# Patient Record
Sex: FEMALE | Race: WHITE | Employment: OTHER | URBAN - METROPOLITAN AREA
[De-identification: names, ages, dates, MRNs, and addresses within clinical notes are randomized per-mention and may not be internally consistent; named-entity substitution may affect disease eponyms.]

---

## 2017-12-22 LAB — DIABETIC RETINOPATHY: NEGATIVE

## 2018-07-14 ENCOUNTER — HOSPITAL ENCOUNTER (EMERGENCY)
Age: 73
Discharge: HOME | End: 2018-07-14
Attending: EMERGENCY MEDICINE
Payer: MEDICARE

## 2018-07-14 ENCOUNTER — APPOINTMENT (OUTPATIENT)
Dept: GENERAL RADIOLOGY | Age: 73
End: 2018-07-14
Payer: MEDICARE

## 2018-07-14 VITALS
RESPIRATION RATE: 16 BRPM | HEART RATE: 84 BPM | OXYGEN SATURATION: 93 % | SYSTOLIC BLOOD PRESSURE: 109 MMHG | HEIGHT: 65 IN | BODY MASS INDEX: 27.32 KG/M2 | DIASTOLIC BLOOD PRESSURE: 58 MMHG | WEIGHT: 164 LBS | TEMPERATURE: 97.8 F

## 2018-07-14 DIAGNOSIS — J06.9 UPPER RESPIRATORY TRACT INFECTION, UNSPECIFIED TYPE: Primary | ICD-10-CM

## 2018-07-14 DIAGNOSIS — R06.00 DYSPNEA AND RESPIRATORY ABNORMALITIES: ICD-10-CM

## 2018-07-14 DIAGNOSIS — R06.89 DYSPNEA AND RESPIRATORY ABNORMALITIES: ICD-10-CM

## 2018-07-14 LAB
ANION GAP SERPL CALCULATED.3IONS-SCNC: 15 MMOL/L (ref 7–16)
BASOPHILS ABSOLUTE: 0.04 E9/L (ref 0–0.2)
BASOPHILS RELATIVE PERCENT: 0.7 % (ref 0–2)
BUN BLDV-MCNC: 28 MG/DL (ref 8–23)
CALCIUM SERPL-MCNC: 9.9 MG/DL (ref 8.6–10.2)
CHLORIDE BLD-SCNC: 96 MMOL/L (ref 98–107)
CO2: 25 MMOL/L (ref 22–29)
CREAT SERPL-MCNC: 1.1 MG/DL (ref 0.5–1)
EKG ATRIAL RATE: 83 BPM
EKG P AXIS: 37 DEGREES
EKG P-R INTERVAL: 162 MS
EKG Q-T INTERVAL: 380 MS
EKG QRS DURATION: 70 MS
EKG QTC CALCULATION (BAZETT): 446 MS
EKG R AXIS: 37 DEGREES
EKG T AXIS: 57 DEGREES
EKG VENTRICULAR RATE: 83 BPM
EOSINOPHILS ABSOLUTE: 0.1 E9/L (ref 0.05–0.5)
EOSINOPHILS RELATIVE PERCENT: 1.7 % (ref 0–6)
GFR AFRICAN AMERICAN: 59
GFR NON-AFRICAN AMERICAN: 49 ML/MIN/1.73
GLUCOSE BLD-MCNC: 255 MG/DL (ref 74–109)
HCT VFR BLD CALC: 36.9 % (ref 34–48)
HEMOGLOBIN: 12.2 G/DL (ref 11.5–15.5)
IMMATURE GRANULOCYTES #: 0.02 E9/L
IMMATURE GRANULOCYTES %: 0.3 % (ref 0–5)
LYMPHOCYTES ABSOLUTE: 1.69 E9/L (ref 1.5–4)
LYMPHOCYTES RELATIVE PERCENT: 28.7 % (ref 20–42)
MCH RBC QN AUTO: 30 PG (ref 26–35)
MCHC RBC AUTO-ENTMCNC: 33.1 % (ref 32–34.5)
MCV RBC AUTO: 90.7 FL (ref 80–99.9)
MONOCYTES ABSOLUTE: 0.47 E9/L (ref 0.1–0.95)
MONOCYTES RELATIVE PERCENT: 8 % (ref 2–12)
NEUTROPHILS ABSOLUTE: 3.56 E9/L (ref 1.8–7.3)
NEUTROPHILS RELATIVE PERCENT: 60.6 % (ref 43–80)
PDW BLD-RTO: 13.1 FL (ref 11.5–15)
PLATELET # BLD: 255 E9/L (ref 130–450)
PMV BLD AUTO: 10.5 FL (ref 7–12)
POTASSIUM SERPL-SCNC: 4.7 MMOL/L (ref 3.5–5)
PRO-BNP: 17 PG/ML (ref 0–125)
RBC # BLD: 4.07 E12/L (ref 3.5–5.5)
SODIUM BLD-SCNC: 136 MMOL/L (ref 132–146)
TROPONIN: <0.01 NG/ML (ref 0–0.03)
WBC # BLD: 5.9 E9/L (ref 4.5–11.5)

## 2018-07-14 PROCEDURE — 71045 X-RAY EXAM CHEST 1 VIEW: CPT

## 2018-07-14 PROCEDURE — 99285 EMERGENCY DEPT VISIT HI MDM: CPT

## 2018-07-14 PROCEDURE — 85025 COMPLETE CBC W/AUTO DIFF WBC: CPT

## 2018-07-14 PROCEDURE — 80048 BASIC METABOLIC PNL TOTAL CA: CPT

## 2018-07-14 PROCEDURE — 83880 ASSAY OF NATRIURETIC PEPTIDE: CPT

## 2018-07-14 PROCEDURE — 84484 ASSAY OF TROPONIN QUANT: CPT

## 2018-07-14 RX ORDER — FLUTICASONE PROPIONATE 50 MCG
1 SPRAY, SUSPENSION (ML) NASAL DAILY
Qty: 1 BOTTLE | Refills: 0 | Status: SHIPPED | OUTPATIENT
Start: 2018-07-14 | End: 2018-10-04

## 2018-07-14 RX ORDER — FLUTICASONE PROPIONATE 50 MCG
1 SPRAY, SUSPENSION (ML) NASAL DAILY
Qty: 1 BOTTLE | Refills: 0 | Status: SHIPPED | OUTPATIENT
Start: 2018-07-14 | End: 2018-07-14

## 2018-07-14 RX ORDER — FLUTICASONE PROPIONATE 50 MCG
1 SPRAY, SUSPENSION (ML) NASAL DAILY
Qty: 1 BOTTLE | Refills: 0 | Status: SHIPPED | OUTPATIENT
Start: 2018-07-14 | End: 2019-01-01

## 2018-07-14 ASSESSMENT — ENCOUNTER SYMPTOMS
CONSTIPATION: 0
RHINORRHEA: 0
COUGH: 0
SHORTNESS OF BREATH: 1
BACK PAIN: 0
SORE THROAT: 0
COLOR CHANGE: 0
SPUTUM PRODUCTION: 0
BLOOD IN STOOL: 0
NAUSEA: 0
HEMOPTYSIS: 0
DIARRHEA: 0
ABDOMINAL PAIN: 0
VOMITING: 0

## 2018-07-14 NOTE — ED PROVIDER NOTES
93-94%. She was not short of breath. [EM]      ED Course User Index  [EM] Michaela Temple   [TG] Ivannat Anna DO       --------------------------------------------- PAST HISTORY ---------------------------------------------  Past Medical History:  has a past medical history of Arthritis; Diabetes mellitus ([facility] [address] Fracture, femur ([facility] [address] Hyperlipidemia; Hypertension; Left lower quadrant pain; Memory changes; and Uses crutches. Past Surgical History:  has a past surgical history that includes Wrist surgery (Left); Femur Surgery (06/02/2017); fracture surgery; Colonoscopy (08/03/2017); Cataract removal with implant (Bilateral); and Appendectomy (08/29/2017). Social History:  reports that she has never smoked. She has never used smokeless tobacco. She reports that she does not drink alcohol or use drugs. Family History: family history includes Heart Disease in her mother. The patients home medications have been reviewed.     Allergies: Sulfa antibiotics    -------------------------------------------------- RESULTS -------------------------------------------------  Labs:  Results for orders placed or performed during the hospital encounter of 07/14/18   CBC Auto Differential   Result Value Ref Range    WBC 5.9 4.5 - 11.5 E9/L    RBC 4.07 3.50 - 5.50 E12/L    Hemoglobin 12.2 11.5 - 15.5 g/dL    Hematocrit 36.9 34.0 - 48.0 %    MCV 90.7 80.0 - 99.9 fL    MCH 30.0 26.0 - 35.0 pg    MCHC 33.1 32.0 - 34.5 %    RDW 13.1 11.5 - 15.0 fL    Platelets 560 709 - 274 E9/L    MPV 10.5 7.0 - 12.0 fL    Neutrophils % 60.6 43.0 - 80.0 %    Immature Granulocytes % 0.3 0.0 - 5.0 %    Lymphocytes % 28.7 20.0 - 42.0 %    Monocytes % 8.0 2.0 - 12.0 %    Eosinophils % 1.7 0.0 - 6.0 %    Basophils % 0.7 0.0 - 2.0 %    Neutrophils # 3.56 1.80 - 7.30 E9/L    Immature Granulocytes # 0.02 E9/L    Lymphocytes # 1.69 1.50 - 4.00 E9/L    Monocytes # 0.47 0.10 - 0.95 E9/L    Eosinophils # 0.10 0.05 - 0.50 E9/L    Basophils # 0.04 0.00

## 2018-08-04 ENCOUNTER — HOSPITAL ENCOUNTER (EMERGENCY)
Age: 73
Discharge: HOME | End: 2018-08-04
Payer: MEDICARE

## 2018-08-04 ENCOUNTER — APPOINTMENT (OUTPATIENT)
Dept: CT IMAGING | Age: 73
End: 2018-08-04
Payer: MEDICARE

## 2018-08-04 ENCOUNTER — APPOINTMENT (OUTPATIENT)
Dept: GENERAL RADIOLOGY | Age: 73
End: 2018-08-04
Payer: MEDICARE

## 2018-08-04 VITALS
DIASTOLIC BLOOD PRESSURE: 63 MMHG | HEART RATE: 91 BPM | RESPIRATION RATE: 16 BRPM | SYSTOLIC BLOOD PRESSURE: 137 MMHG | TEMPERATURE: 98.5 F | OXYGEN SATURATION: 98 %

## 2018-08-04 DIAGNOSIS — S09.90XA INJURY OF HEAD, INITIAL ENCOUNTER: Primary | ICD-10-CM

## 2018-08-04 DIAGNOSIS — S00.03XA HEMATOMA OF SCALP, INITIAL ENCOUNTER: ICD-10-CM
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
## 2018-08-04 PROCEDURE — 99284 EMERGENCY DEPT VISIT MOD MDM: CPT

## 2018-08-04 PROCEDURE — 6370000000 HC RX 637 (ALT 250 FOR IP): Performed by: NURSE PRACTITIONER

## 2018-08-04 PROCEDURE — 72040 X-RAY EXAM NECK SPINE 2-3 VW: CPT

## 2018-08-04 PROCEDURE — 70450 CT HEAD/BRAIN W/O DYE: CPT

## 2018-08-04 RX ORDER — ACETAMINOPHEN 325 MG/1
650 TABLET ORAL ONCE
Status: COMPLETED | OUTPATIENT
Start: 2018-08-04 | End: 2018-08-04

## 2018-08-04 RX ORDER — DIAPER,BRIEF,INFANT-TODD,DISP
EACH MISCELLANEOUS ONCE
Status: DISCONTINUED | OUTPATIENT
Start: 2018-08-04 | End: 2018-08-04 | Stop reason: HOSPADM

## 2018-08-04 RX ADMIN — ACETAMINOPHEN 650 MG: 325 TABLET ORAL at 16:57

## 2018-08-04 ASSESSMENT — PAIN DESCRIPTION - PAIN TYPE: TYPE: ACUTE PAIN

## 2018-08-04 ASSESSMENT — PAIN SCALES - GENERAL
PAINLEVEL_OUTOF10: 5
PAINLEVEL_OUTOF10: 5

## 2018-08-04 ASSESSMENT — PAIN DESCRIPTION - LOCATION: LOCATION: HEAD

## 2018-08-04 NOTE — ED PROVIDER NOTES
has never smoked. She has never used smokeless tobacco. She reports that she does not drink alcohol or use drugs. Family History: family history includes Heart Disease in her mother. Allergies: Sulfa antibiotics    Physical Exam          ED Triage Vitals [08/04/18 1621]   BP Temp Temp Source Pulse Resp SpO2 Height Weight   137/63 98.5 °F (36.9 °C) Oral 91 16 98 % -- --      Oxygen Saturation Interpretation: Normal.    Constitutional: Level of Consciousness: Awake and alert, cooperative. ETOH: No.               Distress: none. Head:  Traumatic:  yes: abrasion and cephalohematoma to right forehead. Scalp Tenderness:  frontal.                  Deformity: no.               Skin:  Cephalohematoma right forehead and Abrasion right forehead. Eyes:  PERRL, EOMI. No periorbital ecchymoses. Conjunctiva: normal.  Ears:  External ears without lesions. Throat:  Airway patient. Neck:  Supple. No midline tenderness, full ROM. Chest:  Symmetrical without visible rash or tenderness. Respiratory:  Clear to auscultation and breath sounds equal.  CV:  Regular rate and rhythm, normal heart sounds, without pathological murmurs. GI:  Abdomen Soft, nontender. No abrasions, ecchymoses, or lacerations. Back:  No costovertebral, paravertebral, intervertebral, or vertebral tenderness or spasm. Integument:  No abrasions, ecchymoses, or lacerations unless noted elsewhere. Extremities  No tenderness or swelling. Normal, painless range of motion. No neurovascular deficit. Neurological:  Oriented x 3,  GCS15. Motor functions intact. CN 2-12 grossly intact. Lab / Imaging Results   (All laboratory and radiology results have been personally reviewed by myself)  Labs:  No results found for this visit on 08/04/18. Imaging: All Radiology results interpreted by Radiologist unless otherwise noted.   XR CERVICAL SPINE (2-3 VIEWS)   Final Result      CT Head WO Contrast   Final Result        ED

## 2018-08-06 ENCOUNTER — HOSPITAL ENCOUNTER (EMERGENCY)
Age: 73
Discharge: HOME | End: 2018-08-06
Attending: EMERGENCY MEDICINE
Payer: MEDICARE

## 2018-08-06 VITALS
WEIGHT: 164 LBS | SYSTOLIC BLOOD PRESSURE: 124 MMHG | TEMPERATURE: 98.8 F | DIASTOLIC BLOOD PRESSURE: 59 MMHG | BODY MASS INDEX: 27.32 KG/M2 | HEIGHT: 65 IN | HEART RATE: 78 BPM | RESPIRATION RATE: 20 BRPM | OXYGEN SATURATION: 96 %

## 2018-08-06 DIAGNOSIS — S00.11XS: Primary | ICD-10-CM

## 2018-08-06 PROCEDURE — 99283 EMERGENCY DEPT VISIT LOW MDM: CPT

## 2018-08-06 ASSESSMENT — ENCOUNTER SYMPTOMS
EYE PAIN: 0
SHORTNESS OF BREATH: 0
COLOR CHANGE: 1
FACIAL SWELLING: 1
VOMITING: 0
NAUSEA: 0
COUGH: 0
WHEEZING: 0
VISUAL CHANGE: 0
ABDOMINAL PAIN: 0
PHOTOPHOBIA: 1
CHEST TIGHTNESS: 0
BOWEL INCONTINENCE: 0

## 2018-08-06 ASSESSMENT — PAIN SCALES - GENERAL: PAINLEVEL_OUTOF10: 5

## 2018-08-06 ASSESSMENT — PAIN DESCRIPTION - DESCRIPTORS: DESCRIPTORS: ACHING

## 2018-08-06 ASSESSMENT — PAIN DESCRIPTION - ORIENTATION: ORIENTATION: RIGHT

## 2018-08-06 ASSESSMENT — PAIN DESCRIPTION - FREQUENCY: FREQUENCY: CONTINUOUS

## 2018-08-06 ASSESSMENT — PAIN DESCRIPTION - LOCATION: LOCATION: FACE;HEAD

## 2018-08-06 NOTE — ED PROVIDER NOTES
patient's chart from 2 days ago was reviewed with specifically looking at the head CT that was performed. This result showed no signs of facial or skull fracture at that time. As a splint to the patient that the hematoma that was on these forehead above the eye is most likely responsible for the bruising and swelling of the eyelid as gravity pulls the blood down and tries to heal. It was offered to the patient that we could perform a repeat CT this time looking at the facial bones if the patient requested it but it was not necessary. The patient declined facial CT and is okay to be discharged home with follow-up with her primary care physician.            --------------------------------------------- PAST HISTORY ---------------------------------------------  Past Medical History:  has a past medical history of Arthritis; Diabetes mellitus ([facility] [address] Fracture, femur ([facility] [address] Hyperlipidemia; Hypertension; Left lower quadrant pain; Memory changes; and Uses crutches. Past Surgical History:  has a past surgical history that includes Wrist surgery (Left); Femur Surgery (06/02/2017); fracture surgery; Colonoscopy (08/03/2017); Cataract removal with implant (Bilateral); and Appendectomy (08/29/2017). Social History:  reports that she has never smoked. She has never used smokeless tobacco. She reports that she does not drink alcohol or use drugs. Family History: family history includes Heart Disease in her mother. The patients home medications have been reviewed. Allergies: Sulfa antibiotics    -------------------------------------------------- RESULTS -------------------------------------------------  Labs:  No results found for this visit on 08/06/18.     Radiology:  No orders to display       ------------------------- NURSING NOTES AND VITALS REVIEWED ---------------------------  Date / Time Roomed:  8/6/2018  1:08 PM  ED Bed Assignment:  20/20    The nursing notes within the ED encounter and vital signs as

## 2018-10-04 ENCOUNTER — APPOINTMENT (OUTPATIENT)
Dept: CT IMAGING | Age: 73
DRG: 481 | End: 2018-10-04
Payer: MEDICARE

## 2018-10-04 ENCOUNTER — APPOINTMENT (OUTPATIENT)
Dept: GENERAL RADIOLOGY | Age: 73
DRG: 481 | End: 2018-10-04
Payer: MEDICARE

## 2018-10-04 ENCOUNTER — ANESTHESIA EVENT (OUTPATIENT)
Dept: OPERATING ROOM | Age: 73
DRG: 481 | End: 2018-10-04
Payer: MEDICARE

## 2018-10-04 ENCOUNTER — HOSPITAL ENCOUNTER (INPATIENT)
Age: 73
LOS: 5 days | Discharge: NURSING, REHAB OR LONG-TERM CARE | DRG: 481 | End: 2018-10-09
Attending: EMERGENCY MEDICINE | Admitting: ORTHOPAEDIC SURGERY
Payer: MEDICARE

## 2018-10-04 DIAGNOSIS — S72.012A CLOSED SUBCAPITAL FRACTURE OF LEFT FEMUR, INITIAL ENCOUNTER (HCC): Primary | ICD-10-CM

## 2018-10-04 PROBLEM — S72.002A CLOSED LEFT HIP FRACTURE, INITIAL ENCOUNTER (HCC): Status: ACTIVE | Noted: 2018-10-04

## 2018-10-04 LAB
ANION GAP SERPL CALCULATED.3IONS-SCNC: 12 MMOL/L (ref 7–16)
APTT: 24.9 SEC (ref 24.5–35.1)
BACTERIA: ABNORMAL /HPF
BASOPHILS ABSOLUTE: 0.04 E9/L (ref 0–0.2)
BASOPHILS RELATIVE PERCENT: 0.3 % (ref 0–2)
BILIRUBIN URINE: NEGATIVE
BLOOD, URINE: NEGATIVE
BUN BLDV-MCNC: 21 MG/DL (ref 8–23)
CALCIUM SERPL-MCNC: 9.5 MG/DL (ref 8.6–10.2)
CHLORIDE BLD-SCNC: 102 MMOL/L (ref 98–107)
CLARITY: ABNORMAL
CO2: 25 MMOL/L (ref 22–29)
COLOR: ABNORMAL
CREAT SERPL-MCNC: 1 MG/DL (ref 0.5–1)
EOSINOPHILS ABSOLUTE: 0.13 E9/L (ref 0.05–0.5)
EOSINOPHILS RELATIVE PERCENT: 0.9 % (ref 0–6)
GFR AFRICAN AMERICAN: >60
GFR NON-AFRICAN AMERICAN: 54 ML/MIN/1.73
GLUCOSE BLD-MCNC: 159 MG/DL (ref 74–109)
GLUCOSE URINE: NEGATIVE MG/DL
HCT VFR BLD CALC: 37.5 % (ref 34–48)
HEMOGLOBIN: 12.3 G/DL (ref 11.5–15.5)
IMMATURE GRANULOCYTES #: 0.08 E9/L
IMMATURE GRANULOCYTES %: 0.5 % (ref 0–5)
INR BLD: 1
KETONES, URINE: NEGATIVE MG/DL
LEUKOCYTE ESTERASE, URINE: NEGATIVE
LYMPHOCYTES ABSOLUTE: 1.71 E9/L (ref 1.5–4)
LYMPHOCYTES RELATIVE PERCENT: 11.6 % (ref 20–42)
MCH RBC QN AUTO: 30.2 PG (ref 26–35)
MCHC RBC AUTO-ENTMCNC: 32.8 % (ref 32–34.5)
MCV RBC AUTO: 92.1 FL (ref 80–99.9)
METER GLUCOSE: 218 MG/DL (ref 70–110)
METER GLUCOSE: 223 MG/DL (ref 70–110)
MONOCYTES ABSOLUTE: 0.77 E9/L (ref 0.1–0.95)
MONOCYTES RELATIVE PERCENT: 5.2 % (ref 2–12)
NEUTROPHILS ABSOLUTE: 12.05 E9/L (ref 1.8–7.3)
NEUTROPHILS RELATIVE PERCENT: 81.5 % (ref 43–80)
NITRITE, URINE: POSITIVE
PDW BLD-RTO: 13.2 FL (ref 11.5–15)
PH UA: 5.5 (ref 5–9)
PLATELET # BLD: 254 E9/L (ref 130–450)
PMV BLD AUTO: 11.6 FL (ref 7–12)
POTASSIUM SERPL-SCNC: 4.6 MMOL/L (ref 3.5–5)
PROTEIN UA: NEGATIVE MG/DL
PROTHROMBIN TIME: 11.3 SEC (ref 9.3–12.4)
RBC # BLD: 4.07 E12/L (ref 3.5–5.5)
RBC UA: ABNORMAL /HPF (ref 0–2)
SODIUM BLD-SCNC: 139 MMOL/L (ref 132–146)
SPECIFIC GRAVITY UA: 1.01 (ref 1–1.03)
UROBILINOGEN, URINE: 0.2 E.U./DL
WBC # BLD: 14.8 E9/L (ref 4.5–11.5)
WBC UA: ABNORMAL /HPF (ref 0–5)

## 2018-10-04 PROCEDURE — 73700 CT LOWER EXTREMITY W/O DYE: CPT

## 2018-10-04 PROCEDURE — 87077 CULTURE AEROBIC IDENTIFY: CPT

## 2018-10-04 PROCEDURE — 80048 BASIC METABOLIC PNL TOTAL CA: CPT

## 2018-10-04 PROCEDURE — 6370000000 HC RX 637 (ALT 250 FOR IP): Performed by: EMERGENCY MEDICINE

## 2018-10-04 PROCEDURE — 71045 X-RAY EXAM CHEST 1 VIEW: CPT

## 2018-10-04 PROCEDURE — 99285 EMERGENCY DEPT VISIT HI MDM: CPT

## 2018-10-04 PROCEDURE — 85610 PROTHROMBIN TIME: CPT

## 2018-10-04 PROCEDURE — 36415 COLL VENOUS BLD VENIPUNCTURE: CPT

## 2018-10-04 PROCEDURE — 1200000000 HC SEMI PRIVATE

## 2018-10-04 PROCEDURE — 99222 1ST HOSP IP/OBS MODERATE 55: CPT | Performed by: INTERNAL MEDICINE

## 2018-10-04 PROCEDURE — 94761 N-INVAS EAR/PLS OXIMETRY MLT: CPT

## 2018-10-04 PROCEDURE — 85025 COMPLETE CBC W/AUTO DIFF WBC: CPT

## 2018-10-04 PROCEDURE — 87040 BLOOD CULTURE FOR BACTERIA: CPT

## 2018-10-04 PROCEDURE — 6370000000 HC RX 637 (ALT 250 FOR IP): Performed by: NURSE PRACTITIONER

## 2018-10-04 PROCEDURE — 85730 THROMBOPLASTIN TIME PARTIAL: CPT

## 2018-10-04 PROCEDURE — 73502 X-RAY EXAM HIP UNI 2-3 VIEWS: CPT

## 2018-10-04 PROCEDURE — APPSS45 APP SPLIT SHARED TIME 31-45 MINUTES: Performed by: NURSE PRACTITIONER

## 2018-10-04 PROCEDURE — 2580000003 HC RX 258: Performed by: NURSE PRACTITIONER

## 2018-10-04 PROCEDURE — 81001 URINALYSIS AUTO W/SCOPE: CPT

## 2018-10-04 PROCEDURE — 51702 INSERT TEMP BLADDER CATH: CPT

## 2018-10-04 PROCEDURE — 87186 SC STD MICRODIL/AGAR DIL: CPT

## 2018-10-04 PROCEDURE — 70450 CT HEAD/BRAIN W/O DYE: CPT

## 2018-10-04 PROCEDURE — 72125 CT NECK SPINE W/O DYE: CPT

## 2018-10-04 PROCEDURE — 87088 URINE BACTERIA CULTURE: CPT

## 2018-10-04 PROCEDURE — 82962 GLUCOSE BLOOD TEST: CPT

## 2018-10-04 RX ORDER — DEXTROSE MONOHYDRATE 50 MG/ML
100 INJECTION, SOLUTION INTRAVENOUS PRN
Status: DISCONTINUED | OUTPATIENT
Start: 2018-10-04 | End: 2018-10-09 | Stop reason: HOSPADM

## 2018-10-04 RX ORDER — HYDROCODONE BITARTRATE AND ACETAMINOPHEN 5; 325 MG/1; MG/1
1 TABLET ORAL EVERY 6 HOURS PRN
Status: DISCONTINUED | OUTPATIENT
Start: 2018-10-04 | End: 2018-10-05

## 2018-10-04 RX ORDER — DEXTROSE MONOHYDRATE 25 G/50ML
12.5 INJECTION, SOLUTION INTRAVENOUS PRN
Status: DISCONTINUED | OUTPATIENT
Start: 2018-10-04 | End: 2018-10-09 | Stop reason: HOSPADM

## 2018-10-04 RX ORDER — ATORVASTATIN CALCIUM 40 MG/1
20 TABLET, FILM COATED ORAL
Status: DISCONTINUED | OUTPATIENT
Start: 2018-10-04 | End: 2018-10-09 | Stop reason: HOSPADM

## 2018-10-04 RX ORDER — SODIUM CHLORIDE 0.9 % (FLUSH) 0.9 %
10 SYRINGE (ML) INJECTION PRN
Status: DISCONTINUED | OUTPATIENT
Start: 2018-10-04 | End: 2018-10-05

## 2018-10-04 RX ORDER — LISINOPRIL 10 MG/1
10 TABLET ORAL DAILY
Status: DISCONTINUED | OUTPATIENT
Start: 2018-10-04 | End: 2018-10-09 | Stop reason: HOSPADM

## 2018-10-04 RX ORDER — M-VIT,TX,IRON,MINS/CALC/FOLIC 27MG-0.4MG
1 TABLET ORAL
Status: DISCONTINUED | OUTPATIENT
Start: 2018-10-05 | End: 2018-10-09 | Stop reason: HOSPADM

## 2018-10-04 RX ORDER — NICOTINE POLACRILEX 4 MG
15 LOZENGE BUCCAL PRN
Status: DISCONTINUED | OUTPATIENT
Start: 2018-10-04 | End: 2018-10-09 | Stop reason: HOSPADM

## 2018-10-04 RX ORDER — SODIUM CHLORIDE 0.9 % (FLUSH) 0.9 %
10 SYRINGE (ML) INJECTION EVERY 12 HOURS SCHEDULED
Status: DISCONTINUED | OUTPATIENT
Start: 2018-10-04 | End: 2018-10-05

## 2018-10-04 RX ORDER — HYDROCODONE BITARTRATE AND ACETAMINOPHEN 5; 325 MG/1; MG/1
1 TABLET ORAL ONCE
Status: COMPLETED | OUTPATIENT
Start: 2018-10-04 | End: 2018-10-04

## 2018-10-04 RX ORDER — FLUTICASONE PROPIONATE 50 MCG
1 SPRAY, SUSPENSION (ML) NASAL DAILY PRN
Status: DISCONTINUED | OUTPATIENT
Start: 2018-10-04 | End: 2018-10-09 | Stop reason: HOSPADM

## 2018-10-04 RX ORDER — SODIUM CHLORIDE 9 MG/ML
INJECTION, SOLUTION INTRAVENOUS CONTINUOUS
Status: DISCONTINUED | OUTPATIENT
Start: 2018-10-04 | End: 2018-10-05

## 2018-10-04 RX ORDER — ACETAMINOPHEN 325 MG/1
650 TABLET ORAL EVERY 4 HOURS PRN
Status: DISCONTINUED | OUTPATIENT
Start: 2018-10-04 | End: 2018-10-09 | Stop reason: HOSPADM

## 2018-10-04 RX ORDER — ONDANSETRON 2 MG/ML
4 INJECTION INTRAMUSCULAR; INTRAVENOUS EVERY 6 HOURS PRN
Status: DISCONTINUED | OUTPATIENT
Start: 2018-10-04 | End: 2018-10-05

## 2018-10-04 RX ORDER — ATORVASTATIN CALCIUM 20 MG/1
20 TABLET, FILM COATED ORAL
COMMUNITY
End: 2019-01-01 | Stop reason: SDUPTHER

## 2018-10-04 RX ORDER — ONDANSETRON 2 MG/ML
4 INJECTION INTRAMUSCULAR; INTRAVENOUS EVERY 6 HOURS PRN
Status: DISCONTINUED | OUTPATIENT
Start: 2018-10-04 | End: 2018-10-04

## 2018-10-04 RX ORDER — GLIPIZIDE 10 MG/1
10 TABLET, FILM COATED, EXTENDED RELEASE ORAL 2 TIMES DAILY WITH MEALS
COMMUNITY
End: 2019-01-01 | Stop reason: SDUPTHER

## 2018-10-04 RX ORDER — HYDROCODONE BITARTRATE AND ACETAMINOPHEN 5; 325 MG/1; MG/1
0.5 TABLET ORAL ONCE
Status: COMPLETED | OUTPATIENT
Start: 2018-10-04 | End: 2018-10-04

## 2018-10-04 RX ADMIN — INSULIN LISPRO 1 UNITS: 100 INJECTION, SOLUTION INTRAVENOUS; SUBCUTANEOUS at 20:47

## 2018-10-04 RX ADMIN — INSULIN LISPRO 2 UNITS: 100 INJECTION, SOLUTION INTRAVENOUS; SUBCUTANEOUS at 18:07

## 2018-10-04 RX ADMIN — HYDROCODONE BITARTRATE AND ACETAMINOPHEN 0.5 TABLET: 5; 325 TABLET ORAL at 10:03

## 2018-10-04 RX ADMIN — HYDROCODONE BITARTRATE AND ACETAMINOPHEN 0.5 TABLET: 5; 325 TABLET ORAL at 16:42

## 2018-10-04 RX ADMIN — SODIUM CHLORIDE: 9 INJECTION, SOLUTION INTRAVENOUS at 18:03

## 2018-10-04 RX ADMIN — ATORVASTATIN CALCIUM 20 MG: 40 TABLET, FILM COATED ORAL at 18:07

## 2018-10-04 ASSESSMENT — PAIN DESCRIPTION - ORIENTATION: ORIENTATION: LEFT

## 2018-10-04 ASSESSMENT — PAIN SCALES - GENERAL
PAINLEVEL_OUTOF10: 10
PAINLEVEL_OUTOF10: 5
PAINLEVEL_OUTOF10: 0
PAINLEVEL_OUTOF10: 10
PAINLEVEL_OUTOF10: 0

## 2018-10-04 ASSESSMENT — PAIN DESCRIPTION - PAIN TYPE: TYPE: ACUTE PAIN

## 2018-10-04 ASSESSMENT — PAIN DESCRIPTION - LOCATION: LOCATION: HIP

## 2018-10-04 ASSESSMENT — PAIN DESCRIPTION - DESCRIPTORS: DESCRIPTORS: BURNING

## 2018-10-04 ASSESSMENT — LIFESTYLE VARIABLES: SMOKING_STATUS: 0

## 2018-10-04 ASSESSMENT — PAIN DESCRIPTION - FREQUENCY: FREQUENCY: CONTINUOUS

## 2018-10-04 NOTE — ED PROVIDER NOTES
femur fracture. Patient treated with norco for pain control. Labs sent showing mild nonspecific leukocytosis. UA appears contaminated without gross infection. Urine culture sent. Will defer treatment at this time. Ortho consulted, and I spoke with Dr. Rachel Alarcon. Patient will be admitted to medicine with ortho consult for further management. This patient's ED course included: a personal history and physicial examination and re-evaluation prior to disposition  This patient has remained hemodynamically stable during their ED course. Re-Evaluations:           Time 10:22 AM  Patient is stable. Discussed results. Upon re-examination, patient is resting. Left hip XR was negative for fracture. Patient was ambulated. According to , pt is ambulating okay but normal and needs more assistance than usual. Will ordered CT scan of head and hip. Time 1:57 PM  The emergency provider has shared the specific reasons for admission, and has shared results of today's workup; including CT scan results. The patient and spouse are agreeable for admission. Consultation  Time: 2:22 PM.   Spoke with Dr. Rachel Alarcon (Orthopedics). Discussed case. They will provide consultation. Time: 2:23 PM.   Spoke with Dr. Antonio Aguiar (Medicine) in the ED. Discussed case. They will admit the patient. Counseling: The emergency provider has spoken with the patient and discussed todays results, in addition to providing specific details for the plan of care and counseling regarding the diagnosis and prognosis. Questions are answered at this time and they are agreeable with the plan.       --------------------------------- IMPRESSION AND DISPOSITION ---------------------------------    IMPRESSION  1.  Closed subcapital fracture of left femur, initial encounter ([facility] [address]        DISPOSITION  Disposition: Admit to med/surg floor  Patient condition is stable    SCRIBE ATTESTATION  10/4/18, 9:24 AM.    This note is prepared by Yvon Stein

## 2018-10-04 NOTE — CONSULTS
while in the [place] where they own a vacation home. She had 3 cannulated screws to fix her fracture on that side as well. Today we discussed occasions for surgery. We also discussed risks which include risk of infection, blood loss, damage to nerves, arteries and veins, DVT/PE and death. We also discussed possibility of avascular necrosis of the femoral head and nonunion of the fracture. They agree to proceed to surgery. Surgery is planned for tomorrow, most likely early afternoon.
+bowel sounds  Extremities: No lower extremity edema  Neurologic: Normal mood and affect    Intake/Output:  No intake or output data in the 24 hours ending 10/04/18 1753  No intake/output data recorded. Laboratory Tests:  Recent Labs      10/04/18   0750   NA  139   K  4.6   CL  102   CO2  25   BUN  21   CREATININE  1.0   GLUCOSE  159*   CALCIUM  9.5     Recent Labs      10/04/18   0750   WBC  14.8*   RBC  4.07   HGB  12.3   HCT  37.5   MCV  92.1   MCH  30.2   MCHC  32.8   RDW  13.2   PLT  254   MPV  11.6     Lab Results   Component Value Date    TROPONINI <0.01 07/14/2018    TROPONINI <0.01 12/31/2017    TROPONINI <0.01 03/31/2017     Lab Results   Component Value Date    INR 1.0 10/04/2018    PROTIME 11.3 10/04/2018     Cardiac Tests:  ECG: SR, rate 91, no acute STT changes    Telemetry findings reviewed: SR/ST    Chest X-ray: 10/4/18  No airspace opacities or pleural effusion. ASSESSMENT / PLAN:  1. Preoperative cardiac evaluation (left hip fracture s/p mechanical fall)  2. HTN -- 's-150's, on ACE-I/HCTZ as an outpatient  3. HLD -- on statin  4. DM -- on metformin and glipizide  5. Sinus tachycardia (in the setting of the above)    - She may proceed with the anticipated surgery with no further advanced cardiac work-up at this time  - No change in medications from a cardiac standpoint  - Case discussed with the patient, her , and her son today    Thank you for allowing me to participate in your patient's care. Please feel free to contact me if you have any questions or concerns.     David Mcguire MD  [facility]) Cardiology

## 2018-10-04 NOTE — PLAN OF CARE
Problem: Aspiration - Risk of:  Goal: Absence of aspiration  Absence of aspiration    Outcome: Met This Shift

## 2018-10-04 NOTE — PLAN OF CARE
Problem: Infection:  Goal: Absence of urinary tract infection signs and symptoms  Absence of urinary tract infection signs and symptoms    Outcome: Ongoing

## 2018-10-04 NOTE — ED NOTES
Bed: 25  Expected date:   Expected time:   Means of arrival:   Comments:  ems     Andrea Fishman RN  10/04/18 2370

## 2018-10-05 ENCOUNTER — APPOINTMENT (OUTPATIENT)
Dept: GENERAL RADIOLOGY | Age: 73
DRG: 481 | End: 2018-10-05
Payer: MEDICARE

## 2018-10-05 ENCOUNTER — ANESTHESIA (OUTPATIENT)
Dept: OPERATING ROOM | Age: 73
DRG: 481 | End: 2018-10-05
Payer: MEDICARE

## 2018-10-05 VITALS — SYSTOLIC BLOOD PRESSURE: 126 MMHG | TEMPERATURE: 96.6 F | OXYGEN SATURATION: 100 % | DIASTOLIC BLOOD PRESSURE: 78 MMHG

## 2018-10-05 LAB
ANION GAP SERPL CALCULATED.3IONS-SCNC: 16 MMOL/L (ref 7–16)
BUN BLDV-MCNC: 20 MG/DL (ref 8–23)
CALCIUM SERPL-MCNC: 9.5 MG/DL (ref 8.6–10.2)
CHLORIDE BLD-SCNC: 99 MMOL/L (ref 98–107)
CO2: 22 MMOL/L (ref 22–29)
CREAT SERPL-MCNC: 0.8 MG/DL (ref 0.5–1)
GFR AFRICAN AMERICAN: >60
GFR NON-AFRICAN AMERICAN: >60 ML/MIN/1.73
GLUCOSE BLD-MCNC: 220 MG/DL (ref 74–109)
HCT VFR BLD CALC: 39 % (ref 34–48)
HEMOGLOBIN: 13.1 G/DL (ref 11.5–15.5)
MCH RBC QN AUTO: 30.5 PG (ref 26–35)
MCHC RBC AUTO-ENTMCNC: 33.6 % (ref 32–34.5)
MCV RBC AUTO: 90.7 FL (ref 80–99.9)
METER GLUCOSE: 196 MG/DL (ref 70–110)
METER GLUCOSE: 212 MG/DL (ref 70–110)
METER GLUCOSE: 262 MG/DL (ref 70–110)
METER GLUCOSE: 284 MG/DL (ref 70–110)
PDW BLD-RTO: 13.3 FL (ref 11.5–15)
PLATELET # BLD: 245 E9/L (ref 130–450)
PMV BLD AUTO: 11 FL (ref 7–12)
POTASSIUM REFLEX MAGNESIUM: 4.2 MMOL/L (ref 3.5–5)
RBC # BLD: 4.3 E12/L (ref 3.5–5.5)
SODIUM BLD-SCNC: 137 MMOL/L (ref 132–146)
WBC # BLD: 10.8 E9/L (ref 4.5–11.5)

## 2018-10-05 PROCEDURE — 2709999900 HC NON-CHARGEABLE SUPPLY: Performed by: ORTHOPAEDIC SURGERY

## 2018-10-05 PROCEDURE — 6360000002 HC RX W HCPCS: Performed by: ORTHOPAEDIC SURGERY

## 2018-10-05 PROCEDURE — 36415 COLL VENOUS BLD VENIPUNCTURE: CPT

## 2018-10-05 PROCEDURE — 2700000000 HC OXYGEN THERAPY PER DAY

## 2018-10-05 PROCEDURE — 6360000002 HC RX W HCPCS: Performed by: NURSE ANESTHETIST, CERTIFIED REGISTERED

## 2018-10-05 PROCEDURE — 87081 CULTURE SCREEN ONLY: CPT

## 2018-10-05 PROCEDURE — 3600000003 HC SURGERY LEVEL 3 BASE: Performed by: ORTHOPAEDIC SURGERY

## 2018-10-05 PROCEDURE — 1200000000 HC SEMI PRIVATE

## 2018-10-05 PROCEDURE — 0QS704Z REPOSITION LEFT UPPER FEMUR WITH INTERNAL FIXATION DEVICE, OPEN APPROACH: ICD-10-PCS | Performed by: ORTHOPAEDIC SURGERY

## 2018-10-05 PROCEDURE — 99232 SBSQ HOSP IP/OBS MODERATE 35: CPT | Performed by: INTERNAL MEDICINE

## 2018-10-05 PROCEDURE — 3209999900 FLUORO FOR SURGICAL PROCEDURES

## 2018-10-05 PROCEDURE — 80048 BASIC METABOLIC PNL TOTAL CA: CPT

## 2018-10-05 PROCEDURE — 3700000001 HC ADD 15 MINUTES (ANESTHESIA): Performed by: ORTHOPAEDIC SURGERY

## 2018-10-05 PROCEDURE — 3600000013 HC SURGERY LEVEL 3 ADDTL 15MIN: Performed by: ORTHOPAEDIC SURGERY

## 2018-10-05 PROCEDURE — 7100000000 HC PACU RECOVERY - FIRST 15 MIN: Performed by: ORTHOPAEDIC SURGERY

## 2018-10-05 PROCEDURE — 7100000001 HC PACU RECOVERY - ADDTL 15 MIN: Performed by: ORTHOPAEDIC SURGERY

## 2018-10-05 PROCEDURE — 2580000003 HC RX 258: Performed by: NURSE ANESTHETIST, CERTIFIED REGISTERED

## 2018-10-05 PROCEDURE — C1769 GUIDE WIRE: HCPCS | Performed by: ORTHOPAEDIC SURGERY

## 2018-10-05 PROCEDURE — 3700000000 HC ANESTHESIA ATTENDED CARE: Performed by: ORTHOPAEDIC SURGERY

## 2018-10-05 PROCEDURE — 6370000000 HC RX 637 (ALT 250 FOR IP): Performed by: NURSE PRACTITIONER

## 2018-10-05 PROCEDURE — 85027 COMPLETE CBC AUTOMATED: CPT

## 2018-10-05 PROCEDURE — 82962 GLUCOSE BLOOD TEST: CPT

## 2018-10-05 PROCEDURE — 2500000003 HC RX 250 WO HCPCS: Performed by: NURSE ANESTHETIST, CERTIFIED REGISTERED

## 2018-10-05 PROCEDURE — 6370000000 HC RX 637 (ALT 250 FOR IP): Performed by: ORTHOPAEDIC SURGERY

## 2018-10-05 PROCEDURE — C1713 ANCHOR/SCREW BN/BN,TIS/BN: HCPCS | Performed by: ORTHOPAEDIC SURGERY

## 2018-10-05 PROCEDURE — 2580000003 HC RX 258: Performed by: ORTHOPAEDIC SURGERY

## 2018-10-05 DEVICE — SCREW BNE L85MM DIA7.3MM THRD L16MM CANC S STL SELF DRL ST: Type: IMPLANTABLE DEVICE | Site: HIP | Status: FUNCTIONAL

## 2018-10-05 DEVICE — SCREW BNE L90MM DIA7.3MM THRD L16MM CANC S STL SELF DRL ST: Type: IMPLANTABLE DEVICE | Site: HIP | Status: FUNCTIONAL

## 2018-10-05 RX ORDER — ACETAMINOPHEN 325 MG/1
650 TABLET ORAL EVERY 4 HOURS PRN
Status: DISCONTINUED | OUTPATIENT
Start: 2018-10-05 | End: 2018-10-09 | Stop reason: HOSPADM

## 2018-10-05 RX ORDER — DOCUSATE SODIUM 100 MG/1
100 CAPSULE, LIQUID FILLED ORAL 2 TIMES DAILY
Status: DISCONTINUED | OUTPATIENT
Start: 2018-10-05 | End: 2018-10-09 | Stop reason: HOSPADM

## 2018-10-05 RX ORDER — ONDANSETRON 2 MG/ML
4 INJECTION INTRAMUSCULAR; INTRAVENOUS
Status: DISCONTINUED | OUTPATIENT
Start: 2018-10-05 | End: 2018-10-05 | Stop reason: HOSPADM

## 2018-10-05 RX ORDER — TRAMADOL HYDROCHLORIDE 50 MG/1
50 TABLET ORAL EVERY 6 HOURS PRN
Status: DISCONTINUED | OUTPATIENT
Start: 2018-10-05 | End: 2018-10-09 | Stop reason: HOSPADM

## 2018-10-05 RX ORDER — TRAMADOL HYDROCHLORIDE 50 MG/1
25 TABLET ORAL EVERY 6 HOURS PRN
Status: DISCONTINUED | OUTPATIENT
Start: 2018-10-05 | End: 2018-10-09 | Stop reason: HOSPADM

## 2018-10-05 RX ORDER — SODIUM CHLORIDE 9 MG/ML
INJECTION, SOLUTION INTRAVENOUS CONTINUOUS
Status: DISCONTINUED | OUTPATIENT
Start: 2018-10-05 | End: 2018-10-09 | Stop reason: HOSPADM

## 2018-10-05 RX ORDER — SUCCINYLCHOLINE CHLORIDE 20 MG/ML
INJECTION INTRAMUSCULAR; INTRAVENOUS PRN
Status: DISCONTINUED | OUTPATIENT
Start: 2018-10-05 | End: 2018-10-05 | Stop reason: SDUPTHER

## 2018-10-05 RX ORDER — ONDANSETRON 2 MG/ML
4 INJECTION INTRAMUSCULAR; INTRAVENOUS EVERY 6 HOURS PRN
Status: DISCONTINUED | OUTPATIENT
Start: 2018-10-05 | End: 2018-10-09 | Stop reason: HOSPADM

## 2018-10-05 RX ORDER — NEOSTIGMINE METHYLSULFATE 1 MG/ML
INJECTION, SOLUTION INTRAVENOUS PRN
Status: DISCONTINUED | OUTPATIENT
Start: 2018-10-05 | End: 2018-10-05 | Stop reason: SDUPTHER

## 2018-10-05 RX ORDER — PROPOFOL 10 MG/ML
INJECTION, EMULSION INTRAVENOUS PRN
Status: DISCONTINUED | OUTPATIENT
Start: 2018-10-05 | End: 2018-10-05 | Stop reason: SDUPTHER

## 2018-10-05 RX ORDER — ROCURONIUM BROMIDE 10 MG/ML
INJECTION, SOLUTION INTRAVENOUS PRN
Status: DISCONTINUED | OUTPATIENT
Start: 2018-10-05 | End: 2018-10-05 | Stop reason: SDUPTHER

## 2018-10-05 RX ORDER — LIDOCAINE HYDROCHLORIDE 20 MG/ML
INJECTION, SOLUTION EPIDURAL; INFILTRATION; INTRACAUDAL; PERINEURAL PRN
Status: DISCONTINUED | OUTPATIENT
Start: 2018-10-05 | End: 2018-10-05 | Stop reason: SDUPTHER

## 2018-10-05 RX ORDER — DEXAMETHASONE SODIUM PHOSPHATE 4 MG/ML
INJECTION, SOLUTION INTRA-ARTICULAR; INTRALESIONAL; INTRAMUSCULAR; INTRAVENOUS; SOFT TISSUE PRN
Status: DISCONTINUED | OUTPATIENT
Start: 2018-10-05 | End: 2018-10-05 | Stop reason: SDUPTHER

## 2018-10-05 RX ORDER — GLYCOPYRROLATE 0.2 MG/ML
INJECTION INTRAMUSCULAR; INTRAVENOUS PRN
Status: DISCONTINUED | OUTPATIENT
Start: 2018-10-05 | End: 2018-10-05 | Stop reason: SDUPTHER

## 2018-10-05 RX ORDER — SODIUM CHLORIDE 0.9 % (FLUSH) 0.9 %
10 SYRINGE (ML) INJECTION PRN
Status: DISCONTINUED | OUTPATIENT
Start: 2018-10-05 | End: 2018-10-09 | Stop reason: HOSPADM

## 2018-10-05 RX ORDER — SODIUM CHLORIDE 9 MG/ML
INJECTION, SOLUTION INTRAVENOUS CONTINUOUS PRN
Status: DISCONTINUED | OUTPATIENT
Start: 2018-10-05 | End: 2018-10-05 | Stop reason: SDUPTHER

## 2018-10-05 RX ORDER — FENTANYL CITRATE 50 UG/ML
INJECTION, SOLUTION INTRAMUSCULAR; INTRAVENOUS PRN
Status: DISCONTINUED | OUTPATIENT
Start: 2018-10-05 | End: 2018-10-05 | Stop reason: SDUPTHER

## 2018-10-05 RX ORDER — FENTANYL CITRATE 50 UG/ML
INJECTION, SOLUTION INTRAMUSCULAR; INTRAVENOUS PRN
Status: DISCONTINUED | OUTPATIENT
Start: 2018-10-05 | End: 2018-10-05

## 2018-10-05 RX ORDER — FAMOTIDINE 20 MG/1
20 TABLET, FILM COATED ORAL 2 TIMES DAILY
Status: DISCONTINUED | OUTPATIENT
Start: 2018-10-05 | End: 2018-10-09 | Stop reason: HOSPADM

## 2018-10-05 RX ORDER — ONDANSETRON 2 MG/ML
INJECTION INTRAMUSCULAR; INTRAVENOUS PRN
Status: DISCONTINUED | OUTPATIENT
Start: 2018-10-05 | End: 2018-10-05 | Stop reason: SDUPTHER

## 2018-10-05 RX ORDER — SODIUM CHLORIDE 0.9 % (FLUSH) 0.9 %
10 SYRINGE (ML) INJECTION EVERY 12 HOURS SCHEDULED
Status: DISCONTINUED | OUTPATIENT
Start: 2018-10-05 | End: 2018-10-09 | Stop reason: HOSPADM

## 2018-10-05 RX ADMIN — TRAMADOL HYDROCHLORIDE 50 MG: 50 TABLET, FILM COATED ORAL at 21:28

## 2018-10-05 RX ADMIN — FAMOTIDINE 20 MG: 20 TABLET, FILM COATED ORAL at 21:28

## 2018-10-05 RX ADMIN — Medication 2 G: at 13:16

## 2018-10-05 RX ADMIN — SUCCINYLCHOLINE CHLORIDE 100 MG: 20 INJECTION, SOLUTION INTRAMUSCULAR; INTRAVENOUS at 13:22

## 2018-10-05 RX ADMIN — ROCURONIUM BROMIDE 40 MG: 10 SOLUTION INTRAVENOUS at 13:28

## 2018-10-05 RX ADMIN — GLYCOPYRROLATE 0.4 MG: 0.2 INJECTION, SOLUTION INTRAMUSCULAR; INTRAVENOUS at 14:25

## 2018-10-05 RX ADMIN — Medication 3 MG: at 14:25

## 2018-10-05 RX ADMIN — FENTANYL CITRATE 100 MCG: 50 INJECTION, SOLUTION INTRAMUSCULAR; INTRAVENOUS at 13:45

## 2018-10-05 RX ADMIN — DOCUSATE SODIUM 100 MG: 100 CAPSULE, LIQUID FILLED ORAL at 21:28

## 2018-10-05 RX ADMIN — LIDOCAINE HYDROCHLORIDE 60 MG: 20 INJECTION, SOLUTION EPIDURAL; INFILTRATION; INTRACAUDAL; PERINEURAL at 13:22

## 2018-10-05 RX ADMIN — Medication 10 ML: at 21:29

## 2018-10-05 RX ADMIN — ONDANSETRON HYDROCHLORIDE 4 MG: 2 INJECTION, SOLUTION INTRAMUSCULAR; INTRAVENOUS at 13:45

## 2018-10-05 RX ADMIN — INSULIN LISPRO 2 UNITS: 100 INJECTION, SOLUTION INTRAVENOUS; SUBCUTANEOUS at 21:31

## 2018-10-05 RX ADMIN — SODIUM CHLORIDE: 9 INJECTION, SOLUTION INTRAVENOUS at 21:33

## 2018-10-05 RX ADMIN — SODIUM CHLORIDE: 9 INJECTION, SOLUTION INTRAVENOUS at 16:15

## 2018-10-05 RX ADMIN — ASPIRIN 325 MG: 325 TABLET, DELAYED RELEASE ORAL at 21:28

## 2018-10-05 RX ADMIN — SODIUM CHLORIDE: 9 INJECTION, SOLUTION INTRAVENOUS at 13:16

## 2018-10-05 RX ADMIN — PROPOFOL 150 MG: 10 INJECTION, EMULSION INTRAVENOUS at 13:22

## 2018-10-05 RX ADMIN — DEXAMETHASONE SODIUM PHOSPHATE 8 MG: 4 INJECTION, SOLUTION INTRAMUSCULAR; INTRAVENOUS at 13:45

## 2018-10-05 RX ADMIN — Medication 2 G: at 21:00

## 2018-10-05 ASSESSMENT — PULMONARY FUNCTION TESTS
PIF_VALUE: 11
PIF_VALUE: 6
PIF_VALUE: 17
PIF_VALUE: 0
PIF_VALUE: 2
PIF_VALUE: 17
PIF_VALUE: 18
PIF_VALUE: 17
PIF_VALUE: 18
PIF_VALUE: 19
PIF_VALUE: 17
PIF_VALUE: 18
PIF_VALUE: 0
PIF_VALUE: 17
PIF_VALUE: 31
PIF_VALUE: 2
PIF_VALUE: 12
PIF_VALUE: 18
PIF_VALUE: 18
PIF_VALUE: 23
PIF_VALUE: 18
PIF_VALUE: 20
PIF_VALUE: 5
PIF_VALUE: 2
PIF_VALUE: 10
PIF_VALUE: 3
PIF_VALUE: 17
PIF_VALUE: 17
PIF_VALUE: 18
PIF_VALUE: 18
PIF_VALUE: 17
PIF_VALUE: 3
PIF_VALUE: 17
PIF_VALUE: 18
PIF_VALUE: 18
PIF_VALUE: 5
PIF_VALUE: 2
PIF_VALUE: 18
PIF_VALUE: 18
PIF_VALUE: 11
PIF_VALUE: 18
PIF_VALUE: 5
PIF_VALUE: 18
PIF_VALUE: 2
PIF_VALUE: 17
PIF_VALUE: 18
PIF_VALUE: 5
PIF_VALUE: 17
PIF_VALUE: 18
PIF_VALUE: 18
PIF_VALUE: 24
PIF_VALUE: 18
PIF_VALUE: 18
PIF_VALUE: 6
PIF_VALUE: 17
PIF_VALUE: 18
PIF_VALUE: 0
PIF_VALUE: 17
PIF_VALUE: 18
PIF_VALUE: 22
PIF_VALUE: 18
PIF_VALUE: 18
PIF_VALUE: 0
PIF_VALUE: 18
PIF_VALUE: 17
PIF_VALUE: 9
PIF_VALUE: 19
PIF_VALUE: 18
PIF_VALUE: 5
PIF_VALUE: 17
PIF_VALUE: 18
PIF_VALUE: 19
PIF_VALUE: 17
PIF_VALUE: 18

## 2018-10-05 ASSESSMENT — PAIN SCALES - GENERAL
PAINLEVEL_OUTOF10: 0
PAINLEVEL_OUTOF10: 5
PAINLEVEL_OUTOF10: 0

## 2018-10-05 ASSESSMENT — PAIN DESCRIPTION - PAIN TYPE
TYPE: SURGICAL PAIN
TYPE: SURGICAL PAIN

## 2018-10-05 NOTE — ANESTHESIA PRE PROCEDURE
interval changes to history or physical examination (unless noted above). NPO status confirmed. Anesthetic plan, risks, benefits, alternatives discussed with patient. Patient verbalized an understanding and agrees to proceed. Patient's  at bedside. He said that his wife (the patient) has had episodes of aspiration). Will proceed with GETA.       Miguel Martinez MD  Staff Anesthesiologist

## 2018-10-05 NOTE — PLAN OF CARE
Problem: Falls - Risk of:  Goal: Will remain free from falls  Will remain free from falls   Outcome: Met This Shift  Patient educated on fall and safety precautions. Bed alarm on. Call light within reach.  Hourly rounding continues

## 2018-10-06 LAB
HCT VFR BLD CALC: 32.3 % (ref 34–48)
HEMOGLOBIN: 10.9 G/DL (ref 11.5–15.5)
METER GLUCOSE: 253 MG/DL (ref 70–110)
METER GLUCOSE: 276 MG/DL (ref 70–110)
METER GLUCOSE: 285 MG/DL (ref 70–110)
METER GLUCOSE: 343 MG/DL (ref 70–110)
MRSA CULTURE ONLY: NORMAL
ORGANISM: ABNORMAL
URINE CULTURE, ROUTINE: ABNORMAL
URINE CULTURE, ROUTINE: ABNORMAL

## 2018-10-06 PROCEDURE — 85018 HEMOGLOBIN: CPT

## 2018-10-06 PROCEDURE — 6370000000 HC RX 637 (ALT 250 FOR IP): Performed by: NURSE PRACTITIONER

## 2018-10-06 PROCEDURE — G8988 SELF CARE GOAL STATUS: HCPCS

## 2018-10-06 PROCEDURE — 97530 THERAPEUTIC ACTIVITIES: CPT

## 2018-10-06 PROCEDURE — 97161 PT EVAL LOW COMPLEX 20 MIN: CPT

## 2018-10-06 PROCEDURE — 36415 COLL VENOUS BLD VENIPUNCTURE: CPT

## 2018-10-06 PROCEDURE — 97165 OT EVAL LOW COMPLEX 30 MIN: CPT

## 2018-10-06 PROCEDURE — 2580000003 HC RX 258: Performed by: ORTHOPAEDIC SURGERY

## 2018-10-06 PROCEDURE — 82962 GLUCOSE BLOOD TEST: CPT

## 2018-10-06 PROCEDURE — 51798 US URINE CAPACITY MEASURE: CPT

## 2018-10-06 PROCEDURE — 85014 HEMATOCRIT: CPT

## 2018-10-06 PROCEDURE — 99232 SBSQ HOSP IP/OBS MODERATE 35: CPT | Performed by: INTERNAL MEDICINE

## 2018-10-06 PROCEDURE — 97116 GAIT TRAINING THERAPY: CPT

## 2018-10-06 PROCEDURE — 6370000000 HC RX 637 (ALT 250 FOR IP): Performed by: ORTHOPAEDIC SURGERY

## 2018-10-06 PROCEDURE — G8987 SELF CARE CURRENT STATUS: HCPCS

## 2018-10-06 PROCEDURE — 6360000002 HC RX W HCPCS: Performed by: ORTHOPAEDIC SURGERY

## 2018-10-06 PROCEDURE — 1200000000 HC SEMI PRIVATE

## 2018-10-06 RX ADMIN — LISINOPRIL 10 MG: 10 TABLET ORAL at 08:51

## 2018-10-06 RX ADMIN — ASPIRIN 325 MG: 325 TABLET, DELAYED RELEASE ORAL at 08:51

## 2018-10-06 RX ADMIN — DOCUSATE SODIUM 100 MG: 100 CAPSULE, LIQUID FILLED ORAL at 20:07

## 2018-10-06 RX ADMIN — ATORVASTATIN CALCIUM 20 MG: 40 TABLET, FILM COATED ORAL at 20:07

## 2018-10-06 RX ADMIN — DOCUSATE SODIUM 100 MG: 100 CAPSULE, LIQUID FILLED ORAL at 08:51

## 2018-10-06 RX ADMIN — FAMOTIDINE 20 MG: 20 TABLET, FILM COATED ORAL at 20:07

## 2018-10-06 RX ADMIN — INSULIN LISPRO 3 UNITS: 100 INJECTION, SOLUTION INTRAVENOUS; SUBCUTANEOUS at 06:41

## 2018-10-06 RX ADMIN — TRAMADOL HYDROCHLORIDE 50 MG: 50 TABLET, FILM COATED ORAL at 05:49

## 2018-10-06 RX ADMIN — INSULIN LISPRO 2 UNITS: 100 INJECTION, SOLUTION INTRAVENOUS; SUBCUTANEOUS at 20:08

## 2018-10-06 RX ADMIN — INSULIN LISPRO 3 UNITS: 100 INJECTION, SOLUTION INTRAVENOUS; SUBCUTANEOUS at 16:59

## 2018-10-06 RX ADMIN — Medication 10 ML: at 08:51

## 2018-10-06 RX ADMIN — FAMOTIDINE 20 MG: 20 TABLET, FILM COATED ORAL at 08:51

## 2018-10-06 RX ADMIN — Medication 10 ML: at 20:08

## 2018-10-06 RX ADMIN — Medication 2 G: at 04:57

## 2018-10-06 RX ADMIN — Medication 1 TABLET: at 08:51

## 2018-10-06 RX ADMIN — INSULIN LISPRO 3 UNITS: 100 INJECTION, SOLUTION INTRAVENOUS; SUBCUTANEOUS at 11:40

## 2018-10-06 ASSESSMENT — PAIN DESCRIPTION - ORIENTATION
ORIENTATION: LEFT
ORIENTATION: LEFT

## 2018-10-06 ASSESSMENT — PAIN SCALES - GENERAL
PAINLEVEL_OUTOF10: 0
PAINLEVEL_OUTOF10: 7

## 2018-10-06 ASSESSMENT — PAIN DESCRIPTION - PAIN TYPE
TYPE: ACUTE PAIN;SURGICAL PAIN
TYPE: SURGICAL PAIN

## 2018-10-06 ASSESSMENT — PAIN DESCRIPTION - FREQUENCY: FREQUENCY: INTERMITTENT

## 2018-10-06 ASSESSMENT — PAIN DESCRIPTION - DESCRIPTORS: DESCRIPTORS: ACHING;DISCOMFORT;SORE

## 2018-10-06 ASSESSMENT — PAIN DESCRIPTION - LOCATION
LOCATION: HIP
LOCATION: HIP

## 2018-10-06 ASSESSMENT — PAIN DESCRIPTION - ONSET: ONSET: ON-GOING

## 2018-10-06 NOTE — PROGRESS NOTES
[facility] Hospitalist   Progress Note    Admitting Date and Time: 10/4/2018  7:25 AM  Admit Dx: Closed left hip fracture, initial encounter ([facility] [address] [S72.002A]  [facility] [S72.002A]  Closed left hip fracture, initial encounter [facility] [S72.002A]  Closed left hip fracture, initial encounter ([facility] [S72.002A]    Subjective:    Pt denies fever, chills, cp, sob, n/v. Pain controlled     sodium chloride flush  10 mL Intravenous 2 times per day    docusate sodium  100 mg Oral BID    famotidine  20 mg Oral BID    aspirin  325 mg Oral Daily    atorvastatin  20 mg Oral Dinner    therapeutic multivitamin-minerals  1 tablet Oral Daily with breakfast    lisinopril  10 mg Oral Daily    insulin lispro  0-6 Units Subcutaneous TID WC    insulin lispro  0-3 Units Subcutaneous Nightly       traMADol 25 mg Q6H PRN   Or     traMADol 50 mg Q6H PRN   sodium chloride flush 10 mL PRN   acetaminophen 650 mg Q4H PRN   ondansetron 4 mg Q6H PRN   fluticasone 1 spray Daily PRN   magnesium hydroxide 30 mL Daily PRN   glucose 15 g PRN   dextrose 12.5 g PRN   glucagon (rDNA) 1 mg PRN   dextrose 100 mL/hr PRN   acetaminophen 650 mg Q4H PRN        Objective:    BP (!) 116/59   Pulse 81   Temp 97.5 °F (36.4 °C) (Oral)   Resp 16   Ht 5' 5\" (1.651 m)   Wt 178 lb 6.4 oz (80.9 kg)   SpO2 94%   BMI 29.69 kg/m²   Skin: warm and dry, no rash or erythema  Pulmonary/Chest: clear to auscultation bilaterally- no wheezes, rales or rhonchi, normal air movement, no respiratory distress  Cardiovascular: rhythm reg at rate of 84  Abdomen: soft, non-tender, non-distended, normal bowel sounds, no masses or organomegaly  Extremities: no cyanosis, no clubbing and no edema      Recent Labs      10/04/18   0750  10/05/18   0525   NA  139  137   K  4.6  4.2   CL  102  99   CO2  25  22   BUN  21  20   CREATININE  1.0  0.8   GLUCOSE  159*  220*   CALCIUM  9.5  9.5       Recent Labs      10/04/18   0757 10/05/18   0525  10/06/18   0650   WBC  14.8*  10.8   --    RBC  4.07  4.30   --    HGB  12.3  13.1  10.9*   HCT  37.5  39.0  32.3*   MCV  92.1  90.7   --    MCH  30.2  30.5   --    MCHC  32.8  33.6   --    RDW  13.2  13.3   --    PLT  254  245   --    MPV  11.6  11.0   --        Hemoglobin/Hematocrit:    Lab Results   Component Value Date    HGB 10.9 10/06/2018    HCT 32.3 10/06/2018        Radiology:   FLUORO FOR SURGICAL PROCEDURES   Final Result   Intraoperative fluoroscopy, as detailed above. CT Hip Left WO Contrast   Final Result   ALERT:  THIS IS AN ABNORMAL REPORT. Findings communicated   directly with Dr. Alesha Rodriguez at approximately 10/4/2018 1:36 PM .   1. Impacted subcapital fracture of the left proximal femur. 2. Inflammatory stranding and cellulitis along the left posterior   pelvic and upper buttock and left upper thigh subcutaneous fatty   tissues. CT CERVICAL SPINE WO CONTRAST   Final Result   No evidence of fracture or dislocation of cervical spine. There is   severe degenerative changes from C4 through T1 especially at the C4-5,   C5-C6 and C6-C7 with osteophytes, multilevel disc bulges, and reversal   of normal lordosis. There is slight  sclerosis and irregularity of the superior endplate   of T3 which may due to old compression deformity. CT Head WO Contrast   Final Result   1. No acute intracranial hemorrhage or edema. 2. 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 3. Chronic small vessel ischemic changes. 4. Age-appropriate atrophy. XR CHEST PORTABLE   Final Result   No airspace opacities or pleural effusion. XR HIP 2-3 VW W PELVIS LEFT   Final Result   1. No acute fracture or dislocation.              Assessment:    Principal Problem:    Closed subcapital fracture of left femur (HCC)  Active Problems:    Closed left hip fracture, initial encounter

## 2018-10-06 NOTE — PATIENT CARE CONFERENCE
[facility] Quality Flow/Interdisciplinary Rounds Progress Note        Quality Flow Rounds held on October 6, 2018    Disciplines Attending:  Bedside Nurse, ,  and Nursing Unit Leadership    Daljit Fraser was admitted on 10/4/2018  7:25 AM    Anticipated Discharge Date:  Expected Discharge Date: 10/07/18    Disposition:    Eliceo Score:  Eliceo Scale Score: 19    Readmission Risk              Risk of Unplanned Readmission:        13             Discussed patient goal for the day, patient clinical progression, and barriers to discharge. The following Goal(s) of the Day/Commitment(s) have been identified:  Patient to have adequate safety/pain control with progressive ambulation. Discharge planning.       Tabatha Candelario  October 6, 2018

## 2018-10-06 NOTE — PROGRESS NOTES
[facility] Hospitalist   Progress Note    Admitting Date and Time: 10/4/2018  7:25 AM  Admit Dx: Closed left hip fracture, initial encounter ([facility] [address] [S72.002A]  [facility] [S72.002A]  Closed [facility], [facility] [S72.002A]  Closed [facility] fracture, [facility] [S72.002A]    Subjective:    Pt denies fever, chills, cp, sob, n/v. Pt able to respond to my questions but slowly responds.      sodium chloride flush  10 mL Intravenous 2 times per day    docusate sodium  100 mg Oral BID    famotidine  20 mg Oral BID    aspirin  325 mg Oral Daily    ceFAZolin (ANCEF) IVPB  2 g Intravenous Q8H    atorvastatin  20 mg Oral Dinner    therapeutic multivitamin-minerals  1 tablet Oral Daily with breakfast    lisinopril  10 mg Oral Daily    insulin lispro  0-6 Units Subcutaneous TID WC    insulin lispro  0-3 Units Subcutaneous Nightly       traMADol 25 mg Q6H PRN   Or     traMADol 50 mg Q6H PRN   sodium chloride flush 10 mL PRN   acetaminophen 650 mg Q4H PRN   ondansetron 4 mg Q6H PRN   fluticasone 1 spray Daily PRN   magnesium hydroxide 30 mL Daily PRN   glucose 15 g PRN   dextrose 12.5 g PRN   glucagon (rDNA) 1 mg PRN   dextrose 100 mL/hr PRN   acetaminophen 650 mg Q4H PRN        Objective:    BP (!) 140/67   Pulse 96   Temp 96.8 °F (36 °C) (Oral)   Resp 16   Ht 5' 5\" (1.651 m)   Wt 164 lb (74.4 kg)   SpO2 96%   BMI 27.29 kg/m²   Skin: warm and dry, no rash or erythema  Pulmonary/Chest: clear to auscultation bilaterally- no wheezes, rales or rhonchi, normal air movement, no respiratory distress  Cardiovascular: rhythm reg at rate of 98  Abdomen: soft, non-tender, non-distended, normal bowel sounds, no masses or organomegaly  Extremities: no cyanosis, no clubbing and no edema      Recent Labs      10/04/18   0750  10/05/18   0525   NA  139  137   K  4.6  4.2   CL  102  99   CO2  25  22   BUN  21  20   CREATININE  1.0  0.8 GLUCOSE  159*  220*   CALCIUM  9.5  9.5       Recent Labs      10/04/18   0750  10/05/18   0525   WBC  14.8*  10.8   RBC  4.07  4.30   HGB  12.3  13.1   HCT  37.5  39.0   MCV  92.1  90.7   MCH  30.2  30.5   MCHC  32.8  33.6   RDW  13.2  13.3   PLT  254  245   MPV  11.6  11.0       CBC:   Lab Results   Component Value Date    WBC 10.8 10/05/2018    RBC 4.30 10/05/2018    HGB 13.1 10/05/2018    HCT 39.0 10/05/2018    MCV 90.7 10/05/2018    MCH 30.5 10/05/2018    MCHC 33.6 10/05/2018    RDW 13.3 10/05/2018     10/05/2018    MPV 11.0 10/05/2018     Hemoglobin/Hematocrit:    Lab Results   Component Value Date    HGB 13.1 10/05/2018    HCT 39.0 10/05/2018     BMP:    Lab Results   Component Value Date     10/05/2018    K 4.2 10/05/2018    CL 99 10/05/2018    CO2 22 10/05/2018    BUN 20 10/05/2018    LABALBU 4.2 03/26/2017    CREATININE 0.8 10/05/2018    CALCIUM 9.5 10/05/2018    GFRAA >60 10/05/2018    LABGLOM >60 10/05/2018    GLUCOSE 220 10/05/2018        Radiology:   FLUORO FOR SURGICAL PROCEDURES   Final Result   Intraoperative fluoroscopy, as detailed above. CT Hip Left WO Contrast   Final Result   ALERT:  THIS IS AN ABNORMAL REPORT. Findings communicated   directly with Dr. Robel Solitario at approximately 10/4/2018 1:36 PM .   1. Impacted subcapital fracture of the left proximal femur. 2. Inflammatory stranding and cellulitis along the left posterior   pelvic and upper buttock and left upper thigh subcutaneous fatty   tissues. CT CERVICAL SPINE WO CONTRAST   Final Result   No evidence of fracture or dislocation of cervical spine. There is   severe degenerative changes from C4 through T1 especially at the C4-5,   C5-C6 and C6-C7 with osteophytes, multilevel disc bulges, and reversal   of normal lordosis. There is slight  sclerosis and irregularity of the superior endplate   of T3 which may due to old compression deformity.                                CT Head WO Contrast   Final Result

## 2018-10-06 NOTE — PROGRESS NOTES
Physical Therapy    Facility/Department: [facility] SURGERY  Initial Assessment    NAME: Joseph Enciso  : 1945  MRN: [id number]    Date of Service: 10/6/2018    Discharge Recommendations:      PT Equipment Recommendations  Equipment Needed: Yes  Mobility Devices: Lois Tosin: Rolling    Patient Diagnosis(es): The encounter diagnosis was Closed subcapital fracture of left femur, initial encounter ([facility] has a past medical history of Arthritis; Diabetes mellitus ([facility] Fracture, femur ([facility] Hyperlipidemia; Hypertension; Left lower quadrant pain; Memory changes; and Uses crutches. has a past surgical history that includes Wrist surgery (Left); Femur Surgery (2017); fracture surgery; Colonoscopy (2017); Cataract removal with implant (Bilateral); and Appendectomy (2017).     Restrictions  Restrictions/Precautions  Restrictions/Precautions: Weight Bearing, General Precautions, Fall Risk  Required Braces or Orthoses?: No  Lower Extremity Weight Bearing Restrictions  Left Lower Extremity Weight Bearing: Partial Weight Bearing (50%)  Vision/Hearing  Vision: Within Functional Limits (squints a lot/hsb)  Hearing: Within functional limits     Subjective  General  Chart Reviewed: Yes  Patient assessed for rehabilitation services?: Yes  Family / Caregiver Present: Yes  Follows Commands: Within Functional Limits  Pain Screening  Patient Currently in Pain: Yes  Pain Assessment  Pain Type: Acute pain;Surgical pain  Pain Location: Hip  Pain Orientation: Left  Vital Signs  Patient Currently in Pain: Yes       Orientation  Orientation  Overall Orientation Status: Within Functional Limits    Social/Functional History  Social/Functional History  Lives With: Spouse  Type of Home: House  Home Layout: One level  Home Access: Stairs to enter with rails  Entrance Stairs - Number of Steps: Hsb states 13 NICK home with rail on RT  Entrance Stairs - Rails: Right  Home Equipment: Rolling walker  Receives Help From: Family  ADL Assistance: Independent  Homemaking Responsibilities: No  Ambulation Assistance: Independent  Transfer Assistance: Independent  Active : No  Occupation: Retired  Objective          AROM RLE (degrees)  RLE AROM: WFL  AROM LLE (degrees)  LLE General AROM: Hip flex AA/P 60-80*, abd 10-15*, knee flex WNL,ext WNL  Strength RLE  Strength RLE: WFL  Strength LLE  Comment: Hip 3-/5, knee 5/5        Bed mobility  Rolling to Left: Unable to assess  Rolling to Right: Unable to assess  Supine to Sit: Moderate assistance;Maximum assistance  Sit to Supine: Moderate assistance;Maximum assistance  Scooting: Moderate assistance;Maximal assistance  Transfers  Sit to Stand: Moderate Assistance (x 2 PWB )  Stand to sit: Moderate Assistance;Minimal Assistance  Ambulation  Ambulation?: Yes  WB Status: 50% LTLE  More Ambulation?: No  Ambulation 1  Surface: level tile  Device: Rolling Walker  Assistance: Moderate assistance  Quality of Gait: Unable to maintain PWB status,   Stairs/Curb  Stairs?: No              Assessment   Body structures, Functions, Activity limitations: Decreased functional mobility ; Decreased ADL status; Decreased ROM; Decreased balance;Decreased strength;Decreased safe awareness;Decreased high-level IADLs  Assessment: Ambulatory dysfunction  Prognosis: Good  REQUIRES PT FOLLOW UP: Yes  Activity Tolerance  Activity Tolerance: Patient limited by pain; Patient limited by endurance; Other  Activity Tolerance: PWB status limiting advancing mobility         Plan   Plan  Times per week: Daily BID  Current Treatment Recommendations: Strengthening, ROM, Balance Training, Functional Mobility Training, Transfer Training, Gait Training, Equipment Evaluation, Education, & procurement, Patient/Caregiver Education & Training, Manual Therapy - Soft Tissue Mobilization, Safety Education & Training, Neuromuscular Re-education  Plan Comment: [facility] 8/24    G-Code     OutComes Score

## 2018-10-06 NOTE — OP NOTE
[address] [address]                                 OPERATIVE REPORT    PATIENT NAME: Wilene Collet                    :        1945  MED REC NO:   [id number]                            ROOM:  ACCOUNT NO:   [de-identified]                           ADMIT DATE: 10/04/2018  PROVIDER:     Ryan Kelley MD    DATE OF PROCEDURE:  10/05/2018    DIAGNOSIS:  Left femoral neck fracture. PROCEDURE PERFORMED:  Open reduction and internal fixation, left femoral  neck fracture with three cannulated screws with fluoroscopic assistance. SURGEON:  Ryan Kelley MD.    ASSISTANT:  Prema Elliott PA-C (no qualified resident available). ANESTHESIA:  General.    BRIEF HISTORY OF PRESENT ILLNESS:  The patient is a 67-year-old female who  has had gradually increasing difficulty with ambulation secondary to some  sort of neurologic issue over the last several months. She has been  requiring a walker to walk. She got up, tried to go to the bathroom  without a walker on the evening of 10/03/2018 and 10/04/2018. She fell and  was unable to bear weight on the left lower extremity. She was brought to  [facility] Emergency Room where x-rays and evaluation revealed  evidence of a left femoral neck fracture, subcapital.  She was admitted for  management. We discussed treat options. The position of the fracture was  relatively stable, so we chose to proceed with fixation of the fracture  with cannulated screws. All risks, benefits, and indication for surgery,  etc. were discussed with the patient and family and all agreed to proceed. The patient was cleared medically and came to the operating room on  10/05/2018. DESCRIPTION OF PROCEDURE:  She underwent general anesthesia while  positioned supine on her hospital bed. We then transferred her to the  fracture table.   The left lower extremity was placed in gentle patient and family are aware of  the risk of nonunion as well as development of avascular necrosis down the  road with this particular fracture type.         Beth Griffin MD    D: 10/05/2018 15:03:27       T: 10/06/2018 1:27:41     [id number]  Job#: [id number]     Doc#: [id number]    CC:

## 2018-10-07 LAB
HCT VFR BLD CALC: 30.6 % (ref 34–48)
HEMOGLOBIN: 10.4 G/DL (ref 11.5–15.5)
METER GLUCOSE: 203 MG/DL (ref 70–110)
METER GLUCOSE: 238 MG/DL (ref 70–110)
METER GLUCOSE: 245 MG/DL (ref 70–110)
METER GLUCOSE: 332 MG/DL (ref 70–110)

## 2018-10-07 PROCEDURE — 1200000000 HC SEMI PRIVATE

## 2018-10-07 PROCEDURE — 2580000003 HC RX 258: Performed by: ORTHOPAEDIC SURGERY

## 2018-10-07 PROCEDURE — 6370000000 HC RX 637 (ALT 250 FOR IP): Performed by: NURSE PRACTITIONER

## 2018-10-07 PROCEDURE — 99232 SBSQ HOSP IP/OBS MODERATE 35: CPT | Performed by: INTERNAL MEDICINE

## 2018-10-07 PROCEDURE — 82962 GLUCOSE BLOOD TEST: CPT

## 2018-10-07 PROCEDURE — 36415 COLL VENOUS BLD VENIPUNCTURE: CPT

## 2018-10-07 PROCEDURE — 85018 HEMOGLOBIN: CPT

## 2018-10-07 PROCEDURE — 97530 THERAPEUTIC ACTIVITIES: CPT

## 2018-10-07 PROCEDURE — 85014 HEMATOCRIT: CPT

## 2018-10-07 PROCEDURE — 6370000000 HC RX 637 (ALT 250 FOR IP): Performed by: ORTHOPAEDIC SURGERY

## 2018-10-07 RX ADMIN — FAMOTIDINE 20 MG: 20 TABLET, FILM COATED ORAL at 08:41

## 2018-10-07 RX ADMIN — INSULIN LISPRO 2 UNITS: 100 INJECTION, SOLUTION INTRAVENOUS; SUBCUTANEOUS at 06:54

## 2018-10-07 RX ADMIN — FAMOTIDINE 20 MG: 20 TABLET, FILM COATED ORAL at 21:10

## 2018-10-07 RX ADMIN — DOCUSATE SODIUM 100 MG: 100 CAPSULE, LIQUID FILLED ORAL at 08:41

## 2018-10-07 RX ADMIN — Medication 10 ML: at 21:10

## 2018-10-07 RX ADMIN — ATORVASTATIN CALCIUM 20 MG: 40 TABLET, FILM COATED ORAL at 16:33

## 2018-10-07 RX ADMIN — DOCUSATE SODIUM 100 MG: 100 CAPSULE, LIQUID FILLED ORAL at 21:10

## 2018-10-07 RX ADMIN — ASPIRIN 325 MG: 325 TABLET, DELAYED RELEASE ORAL at 08:41

## 2018-10-07 RX ADMIN — Medication 1 TABLET: at 08:41

## 2018-10-07 RX ADMIN — INSULIN LISPRO 2 UNITS: 100 INJECTION, SOLUTION INTRAVENOUS; SUBCUTANEOUS at 21:10

## 2018-10-07 RX ADMIN — INSULIN LISPRO 2 UNITS: 100 INJECTION, SOLUTION INTRAVENOUS; SUBCUTANEOUS at 16:33

## 2018-10-07 RX ADMIN — INSULIN LISPRO 2 UNITS: 100 INJECTION, SOLUTION INTRAVENOUS; SUBCUTANEOUS at 11:55

## 2018-10-07 RX ADMIN — Medication 10 ML: at 08:41

## 2018-10-07 RX ADMIN — LISINOPRIL 10 MG: 10 TABLET ORAL at 08:41

## 2018-10-07 ASSESSMENT — PAIN SCALES - GENERAL
PAINLEVEL_OUTOF10: 0

## 2018-10-08 LAB
ALBUMIN SERPL-MCNC: 3.5 G/DL (ref 3.5–5.2)
ALP BLD-CCNC: 71 U/L (ref 35–104)
ALT SERPL-CCNC: 20 U/L (ref 0–32)
ANION GAP SERPL CALCULATED.3IONS-SCNC: 10 MMOL/L (ref 7–16)
AST SERPL-CCNC: 26 U/L (ref 0–31)
BILIRUB SERPL-MCNC: 0.5 MG/DL (ref 0–1.2)
BUN BLDV-MCNC: 14 MG/DL (ref 8–23)
CALCIUM SERPL-MCNC: 9 MG/DL (ref 8.6–10.2)
CHLORIDE BLD-SCNC: 105 MMOL/L (ref 98–107)
CO2: 24 MMOL/L (ref 22–29)
CREAT SERPL-MCNC: 0.8 MG/DL (ref 0.5–1)
GFR AFRICAN AMERICAN: >60
GFR NON-AFRICAN AMERICAN: >60 ML/MIN/1.73
GLUCOSE BLD-MCNC: 248 MG/DL (ref 74–109)
HCT VFR BLD CALC: 31.1 % (ref 34–48)
HEMOGLOBIN: 10.7 G/DL (ref 11.5–15.5)
METER GLUCOSE: 255 MG/DL (ref 70–110)
METER GLUCOSE: 280 MG/DL (ref 70–110)
METER GLUCOSE: 292 MG/DL (ref 70–110)
METER GLUCOSE: 307 MG/DL (ref 70–110)
POTASSIUM SERPL-SCNC: 4.5 MMOL/L (ref 3.5–5)
SODIUM BLD-SCNC: 139 MMOL/L (ref 132–146)
TOTAL PROTEIN: 6.7 G/DL (ref 6.4–8.3)

## 2018-10-08 PROCEDURE — 36415 COLL VENOUS BLD VENIPUNCTURE: CPT

## 2018-10-08 PROCEDURE — 97530 THERAPEUTIC ACTIVITIES: CPT

## 2018-10-08 PROCEDURE — 51798 US URINE CAPACITY MEASURE: CPT

## 2018-10-08 PROCEDURE — 85018 HEMOGLOBIN: CPT

## 2018-10-08 PROCEDURE — 85014 HEMATOCRIT: CPT

## 2018-10-08 PROCEDURE — 80053 COMPREHEN METABOLIC PANEL: CPT

## 2018-10-08 PROCEDURE — 82962 GLUCOSE BLOOD TEST: CPT

## 2018-10-08 PROCEDURE — 99232 SBSQ HOSP IP/OBS MODERATE 35: CPT | Performed by: INTERNAL MEDICINE

## 2018-10-08 PROCEDURE — 2580000003 HC RX 258: Performed by: ORTHOPAEDIC SURGERY

## 2018-10-08 PROCEDURE — 1200000000 HC SEMI PRIVATE

## 2018-10-08 PROCEDURE — 6370000000 HC RX 637 (ALT 250 FOR IP): Performed by: ORTHOPAEDIC SURGERY

## 2018-10-08 PROCEDURE — 6370000000 HC RX 637 (ALT 250 FOR IP): Performed by: NURSE PRACTITIONER

## 2018-10-08 RX ORDER — TRAMADOL HYDROCHLORIDE 50 MG/1
25 TABLET ORAL EVERY 6 HOURS PRN
Status: SHIPPED | OUTPATIENT
Start: 2018-10-08 | End: 2018-10-18

## 2018-10-08 RX ADMIN — FAMOTIDINE 20 MG: 20 TABLET, FILM COATED ORAL at 08:53

## 2018-10-08 RX ADMIN — DOCUSATE SODIUM 100 MG: 100 CAPSULE, LIQUID FILLED ORAL at 08:53

## 2018-10-08 RX ADMIN — ATORVASTATIN CALCIUM 20 MG: 40 TABLET, FILM COATED ORAL at 16:54

## 2018-10-08 RX ADMIN — INSULIN LISPRO 4 UNITS: 100 INJECTION, SOLUTION INTRAVENOUS; SUBCUTANEOUS at 16:55

## 2018-10-08 RX ADMIN — Medication 1 TABLET: at 08:53

## 2018-10-08 RX ADMIN — Medication 10 ML: at 20:24

## 2018-10-08 RX ADMIN — LISINOPRIL 10 MG: 10 TABLET ORAL at 08:53

## 2018-10-08 RX ADMIN — ASPIRIN 325 MG: 325 TABLET, DELAYED RELEASE ORAL at 08:53

## 2018-10-08 RX ADMIN — Medication 10 ML: at 08:53

## 2018-10-08 RX ADMIN — INSULIN LISPRO 2 UNITS: 100 INJECTION, SOLUTION INTRAVENOUS; SUBCUTANEOUS at 20:21

## 2018-10-08 RX ADMIN — INSULIN LISPRO 3 UNITS: 100 INJECTION, SOLUTION INTRAVENOUS; SUBCUTANEOUS at 12:14

## 2018-10-08 RX ADMIN — INSULIN LISPRO 3 UNITS: 100 INJECTION, SOLUTION INTRAVENOUS; SUBCUTANEOUS at 06:40

## 2018-10-08 ASSESSMENT — PAIN SCALES - PAIN ASSESSMENT IN ADVANCED DEMENTIA (PAINAD)
FACIALEXPRESSION: 0
NEGVOCALIZATION: 0
CONSOLABILITY: 0
BODYLANGUAGE: 0
TOTALSCORE: 0
BREATHING: 0

## 2018-10-08 ASSESSMENT — PAIN SCALES - GENERAL
PAINLEVEL_OUTOF10: 0
PAINLEVEL_OUTOF10: 0

## 2018-10-08 NOTE — CARE COORDINATION
10/08/18 1052   IMM Letter   IMM Letter given to Patient/Family/Significant other/Guardian/POA/by: Betsey Aparicio   IMM Letter date given: 10/08/18   IMM Letter time given: 5549   In anticipation of discharge, copy of original Important message from Medicare delivered to patient and reviewed. Denise Ni.  Mc, MSN, RN  [facility] Case Management  [phone]

## 2018-10-08 NOTE — DISCHARGE SUMMARY
(ULTRAM) 50 MG tablet Take 0.5 tablets by mouth every 6 hours as needed for Pain for up to 10 days. .    Associated Diagnoses: Closed subcapital fracture of left femur, initial encounter ([facility])      aspirin 325 MG EC tablet Take 1 tablet by mouth daily  Qty: 30 tablet, Refills: 3         CONTINUE these medications which have NOT CHANGED    Details   atorvastatin (LIPITOR) 20 MG tablet Take 20 mg by mouth Daily with supper       glipiZIDE (GLUCOTROL XL) 10 MG extended release tablet Take 10 mg by mouth 2 times daily (with meals)      fluticasone (FLONASE) 50 MCG/ACT nasal spray 1 spray by Nasal route daily  Qty: 1 Bottle, Refills: 0      Multiple Vitamins-Minerals (CENTRUM SILVER) TABS Take 1 tablet by mouth daily (with breakfast)       metFORMIN (GLUCOPHAGE-XR) 500 MG extended release tablet Take 1,000 mg by mouth 2 times daily       lisinopril-hydrochlorothiazide (PRINZIDE;ZESTORETIC) 10-12.5 MG per tablet Take 1 tablet by mouth daily (with breakfast)          STOP taking these medications       aspirin 81 MG tablet Comments:   Reason for Stopping:                 Note that more than 30 minutes was spent in preparing discharge papers, discussing discharge with patient, medication review, etc.    Signed:  Electronically signed by Luis Armando Shankar MD on 10/9/2018 at 7:45 AM

## 2018-10-08 NOTE — PROGRESS NOTES
[facility] Hospitalist   Progress Note    Admitting Date and Time: 10/4/2018  7:25 AM  Admit Dx: Closed left hip fracture, initial encounter ([facility] [S72.002A]  [facility] [S72.002A]  Closed left hip fracture, [facility] [S72.002A]  Closed left hip fracture, initial encounter ([facility] [S72.002A]    Subjective:    Pt denies fever, chills, cp, sob, n/v. Pain controlled     sodium chloride flush  10 mL Intravenous 2 times per day    docusate sodium  100 mg Oral BID    famotidine  20 mg Oral BID    aspirin  325 mg Oral Daily    atorvastatin  20 mg Oral Dinner    therapeutic multivitamin-minerals  1 tablet Oral Daily with breakfast    lisinopril  10 mg Oral Daily    insulin lispro  0-6 Units Subcutaneous TID WC    insulin lispro  0-3 Units Subcutaneous Nightly       traMADol 25 mg Q6H PRN   Or     traMADol 50 mg Q6H PRN   sodium chloride flush 10 mL PRN   acetaminophen 650 mg Q4H PRN   ondansetron 4 mg Q6H PRN   fluticasone 1 spray Daily PRN   magnesium hydroxide 30 mL Daily PRN   glucose 15 g PRN   dextrose 12.5 g PRN   glucagon (rDNA) 1 mg PRN   dextrose 100 mL/hr PRN   acetaminophen 650 mg Q4H PRN        Objective:    /60   Pulse 90   Temp 98.8 °F (37.1 °C) (Oral)   Resp 12   Ht 5' 5\" (1.651 m)   Wt 178 lb 6.4 oz (80.9 kg)   SpO2 94%   BMI 29.69 kg/m²   Skin: warm and dry, no rash or erythema  Pulmonary/Chest: clear to auscultation bilaterally- no wheezes, rales or rhonchi, normal air movement, no respiratory distress  Cardiovascular: rhythm reg at rate of 92  Abdomen: soft, non-tender, non-distended, normal bowel sounds, no masses or organomegaly  Extremities: no cyanosis, no clubbing and no edema      Recent Labs      10/05/18   0525   NA  137   K  4.2   CL  99   CO2  22   BUN  20   CREATININE  0.8   GLUCOSE  220*   CALCIUM  9.5       Recent Labs      10/05/18   0525  10/06/18   0650  10/07/18   0315   WBC  10.8   --    --    RBC

## 2018-10-08 NOTE — PROGRESS NOTES
Occupational Therapy  OCCUPATIONAL THERAPY DAILY NOTE    Date:10/8/2018  Patient Name: Marcio Graves  MRN: [id number]  : 1945  Room: [address] [place] [address]A     Patient Active Problem List   Diagnosis    Closed left hip fracture, [facility] encounter [facility])    [facility]    Essential hypertension    Preop cardiovascular exam    Mixed hyperlipidemia    Leukocytosis    Controlled type 2 diabetes mellitus without complication ([facility]    Hyperlipidemia    Acute blood loss anemia       Subjective:  Pt agreeable to therapy with encouragement. Precautions: falls, pwb, O2  Chart Reviewed:  Yes          Independent Supervision Contact Guard Assist Minimal Assist Moderate Assist Maximum Assist Dependent   Feeding          Grooming          UE  Bathing          LE Bathing          UE Dressing     x     LE  Dressing                x    Toileting                   Comments: Mod A to change gown when seated on the EOB. Max A for LB ADL. Functional Transfers:  Supine to sit onto the side of the bed max A. Sit balance on the EOB CGA. Transfer from bed and chair surfaces mod A x2. Assistance for hand placement to increase safety and technique. Functional mobility in room using wheeled walker mod A x2. Verbal and tactile cues for initiation of movement. Pt remained in chair at end of the session.  present. Therapeutic Exercises:  Fair use of UE's for support to maintain PWB while standing/mobility. Other:  Cues for direction following. Assist with transfers and ADL. Education:  Daily orientation, walker and transfer safety    Equipment Recommendations:  Continue to assess     AM-PAC Inpatient Daily Activity Raw Score:  15    Pain Level: /10   Additional Notes:  Pt reports pain \"all over\"  Patient has made  progress during treatment sessions toward set goals. [x] Continue with current OT Plan of care.   [] Prepare for Discharge    Miranda VIGIL/CLINT

## 2018-10-08 NOTE — PROGRESS NOTES
[facility] Quality Flow/Interdisciplinary Rounds Progress Note        Quality Flow Rounds held on October 8, 2018    Disciplines Attending:  Bedside Nurse, ,  and Nursing Unit Leadership    Daljit Fraser was admitted on 10/4/2018  7:25 AM    Anticipated Discharge Date:  Expected Discharge Date: 10/08/18    Disposition:    Eliceo Score:  Eliceo Scale Score: 17    Readmission Risk              Risk of Unplanned Readmission:        14             Discussed patient goal for the day, patient clinical progression, and barriers to discharge.   The following Goal(s) of the Day/Commitment(s) have been identified:   Discharge planning      [facility]  October 8, 2018

## 2018-10-08 NOTE — PLAN OF CARE
Problem: Falls - Risk of:  Goal: Absence of physical injury  Absence of physical injury   Outcome: Met This Shift      Problem: Risk for Impaired Skin Integrity  Goal: Tissue integrity - skin and mucous membranes  Structural intactness and normal physiological function of skin and  mucous membranes.    Outcome: Met This Shift      Problem: Pain - Acute:  Goal: Pain level will decrease  Pain level will decrease    Outcome: Met This Shift      Problem: Pain:  Goal: Pain level will decrease  Pain level will decrease    Outcome: Met This Shift

## 2018-10-08 NOTE — CARE COORDINATION
Social Work:    Referral made to [employer], patient accepted.   Social work to schedule an ambulette once patient is able to urinate, post montgomery removal.    Electronically signed by JANET Goodwin on 10/8/2018 at 11:44 AM

## 2018-10-09 VITALS
HEART RATE: 94 BPM | WEIGHT: 178.4 LBS | BODY MASS INDEX: 29.72 KG/M2 | DIASTOLIC BLOOD PRESSURE: 54 MMHG | SYSTOLIC BLOOD PRESSURE: 140 MMHG | HEIGHT: 65 IN | RESPIRATION RATE: 16 BRPM | OXYGEN SATURATION: 92 % | TEMPERATURE: 98.7 F

## 2018-10-09 LAB
BLOOD CULTURE, ROUTINE: NORMAL
CULTURE, BLOOD 2: NORMAL
METER GLUCOSE: 291 MG/DL (ref 70–110)
METER GLUCOSE: 320 MG/DL (ref 70–110)
METER GLUCOSE: 424 MG/DL (ref 70–110)
METER GLUCOSE: 447 MG/DL (ref 70–110)

## 2018-10-09 PROCEDURE — 2580000003 HC RX 258: Performed by: ORTHOPAEDIC SURGERY

## 2018-10-09 PROCEDURE — 6370000000 HC RX 637 (ALT 250 FOR IP): Performed by: INTERNAL MEDICINE

## 2018-10-09 PROCEDURE — 82962 GLUCOSE BLOOD TEST: CPT

## 2018-10-09 PROCEDURE — 97530 THERAPEUTIC ACTIVITIES: CPT

## 2018-10-09 PROCEDURE — G8996 SWALLOW CURRENT STATUS: HCPCS

## 2018-10-09 PROCEDURE — 6370000000 HC RX 637 (ALT 250 FOR IP): Performed by: ORTHOPAEDIC SURGERY

## 2018-10-09 PROCEDURE — G8997 SWALLOW GOAL STATUS: HCPCS

## 2018-10-09 PROCEDURE — 99239 HOSP IP/OBS DSCHRG MGMT >30: CPT | Performed by: INTERNAL MEDICINE

## 2018-10-09 PROCEDURE — 92610 EVALUATE SWALLOWING FUNCTION: CPT

## 2018-10-09 PROCEDURE — 6370000000 HC RX 637 (ALT 250 FOR IP): Performed by: NURSE PRACTITIONER

## 2018-10-09 RX ADMIN — INSULIN LISPRO 12 UNITS: 100 INJECTION, SOLUTION INTRAVENOUS; SUBCUTANEOUS at 13:31

## 2018-10-09 RX ADMIN — FAMOTIDINE 20 MG: 20 TABLET, FILM COATED ORAL at 09:41

## 2018-10-09 RX ADMIN — INSULIN LISPRO 12 UNITS: 100 INJECTION, SOLUTION INTRAVENOUS; SUBCUTANEOUS at 12:06

## 2018-10-09 RX ADMIN — ASPIRIN 325 MG: 325 TABLET, DELAYED RELEASE ORAL at 09:41

## 2018-10-09 RX ADMIN — LISINOPRIL 10 MG: 10 TABLET ORAL at 09:41

## 2018-10-09 RX ADMIN — Medication 1 TABLET: at 09:41

## 2018-10-09 RX ADMIN — DOCUSATE SODIUM 100 MG: 100 CAPSULE, LIQUID FILLED ORAL at 09:41

## 2018-10-09 RX ADMIN — Medication 10 ML: at 10:05

## 2018-10-09 RX ADMIN — INSULIN LISPRO 3 UNITS: 100 INJECTION, SOLUTION INTRAVENOUS; SUBCUTANEOUS at 06:32

## 2018-10-09 ASSESSMENT — PAIN SCALES - PAIN ASSESSMENT IN ADVANCED DEMENTIA (PAINAD)
CONSOLABILITY: 0
TOTALSCORE: 0
FACIALEXPRESSION: 0
BODYLANGUAGE: 0
BREATHING: 0
NEGVOCALIZATION: 0

## 2018-10-09 ASSESSMENT — PAIN SCALES - GENERAL: PAINLEVEL_OUTOF10: 0

## 2018-10-09 NOTE — CARE COORDINATION
Christina Brandt at [facility] aware of all clear for discharge/transfer to [facility]. She will arrange transportation and communicate back with time. Caridad Burns.  Mc, MSN, RN  [place] [address] Case Management  [phone]

## 2018-10-09 NOTE — PROGRESS NOTES
Physical Therapy  Facility/Department: [address] ORTHO SURGERY  Daily Treatment Note  NAME: Kulwinder Perdomo  : 1945  MRN: [id number]    Date of Service: 10/9/2018    Evaluating PT: Ryley Solares PT    Room #:  5207/7952-R    Diagnosis: Closed left hip fracture, initial encounter  Precautions: Fall risk, 50% WB L LE, montgomery  Procedures: ORIF left femoral neck fracture (10/5)    SEE INITIAL EVALUATION NOTE ON 10/6/18 FOR SOCIAL HISTORY     Initial Evaluation  Date: 10/6/18 Treatment Date: 10/9/18 Short Term/ Long Term   Goals   AM-PAC 6 Clicks See Initial Evaluation  See Initial Evaluation   Was pt agreeable to Eval/treatment? Yes    Does pt have pain? No current complaints of pain    Bed Mobility   Rolling: NT  Supine to sit: Max A  Sit to supine: NT  Scooting: Max A    Transfers  Sit to stand: Mod A x 2  Stand to sit: Max A due to fatigue  Stand pivot: NT    Ambulation    35 feet mod A of 2 for balance using WW for support PWB L LE    Stair negotiation:  NT    ROM  NT    Strength  NT    Balance  Sitting EOB: Min A  Dynamic standing: Min A with [employer]      Balance: poor dynamic with [employer]    Patient education  Pt was educated on UE usage with transfers, sequence, posture and WB through UEs during gait    Patient response to education:   Pt verbalized understanding Pt demonstrated skill Pt requires further education in this area   yes With prompt only yes     Additional Comments: Gait slow, guarded and laboring, step to pattern used, much assistance/prompt to maintain PWB L LE. Pt fatigued after activity. Pt was left in a bedside chair with call light in reach    Chair/bed alarm: chair alarm active    Treatment time: 20 minutes  Time out: 09    Pt is making good progress toward established Physical Therapy goals as per increased gait performed this session. Continue with physical therapy current plan of care.     Enrrique Coronado PTA   License Number: [id number]

## 2018-10-09 NOTE — PROGRESS NOTES
Occupational Therapy  OCCUPATIONAL THERAPY DAILY NOTE    Date:10/9/2018  Patient Name: Josee Durant  MRN: [id number]  : 1945  Room: [address] [place] [address]A     Patient Active Problem List   Diagnosis    [facility])    [facility]    Essential hypertension    Preop cardiovascular exam    Mixed hyperlipidemia    Leukocytosis    Controlled type 2 diabetes mellitus without complication ([facility]    Hyperlipidemia    Acute blood loss anemia       Subjective:  Pt agreeable to therapy with encouragement. Precautions: falls, pwb  Chart Reviewed:  Yes          Independent Supervision Contact Guard Assist Minimal Assist Moderate Assist Maximum Assist Dependent   Feeding          Grooming          UE  Bathing          LE Bathing          UE Dressing          LE  Dressing                x    Toileting                   Comments:  Max A for LB ADL. Functional Transfers:  Supine to sit onto the side of the bed max A. Sit balance on the EOB CGA. Transfer from bed and chair surfaces mod A x2. Assistance for hand placement to increase safety and technique. Functional mobility in room using wheeled walker mod A x2. Pt remained in chair at end of the session.  present. Therapeutic Exercises:  Fair use of UE's for support to maintain PWB while standing/mobility. Other:  Cues for direction following. Assist with transfers and ADL. Education:  Daily orientation, walker and transfer safety    Equipment Recommendations:  Continue to assess     AM-PAC Inpatient Daily Activity Raw Score:  15    Pain Level: /10   Additional Notes:  Pt reported \"no\" to pain  Patient has made  progress during treatment sessions toward set goals. [x] Continue with current OT Plan of care.   [] Prepare for Discharge    Luis Armando Bhavesh MUSA/L [id number]    Total Tx Time: 23

## 2018-10-29 ENCOUNTER — HOSPITAL ENCOUNTER (OUTPATIENT)
Age: 73
Discharge: HOME | End: 2018-10-31
Payer: MEDICARE

## 2018-10-29 LAB
ALBUMIN SERPL-MCNC: 3.9 G/DL (ref 3.5–5.2)
ALP BLD-CCNC: 74 U/L (ref 35–104)
ALT SERPL-CCNC: 23 U/L (ref 0–32)
ANION GAP SERPL CALCULATED.3IONS-SCNC: 13 MMOL/L (ref 7–16)
AST SERPL-CCNC: 22 U/L (ref 0–31)
BASOPHILS ABSOLUTE: 0.04 E9/L (ref 0–0.2)
BASOPHILS RELATIVE PERCENT: 0.6 % (ref 0–2)
BILIRUB SERPL-MCNC: 0.4 MG/DL (ref 0–1.2)
BUN BLDV-MCNC: 28 MG/DL (ref 8–23)
CALCIUM SERPL-MCNC: 9.5 MG/DL (ref 8.6–10.2)
CHLORIDE BLD-SCNC: 101 MMOL/L (ref 98–107)
CO2: 24 MMOL/L (ref 22–29)
CREAT SERPL-MCNC: 1.1 MG/DL (ref 0.5–1)
EOSINOPHILS ABSOLUTE: 0.17 E9/L (ref 0.05–0.5)
EOSINOPHILS RELATIVE PERCENT: 2.6 % (ref 0–6)
GFR AFRICAN AMERICAN: 59
GFR NON-AFRICAN AMERICAN: 49 ML/MIN/1.73
GLUCOSE BLD-MCNC: 109 MG/DL (ref 74–109)
HCT VFR BLD CALC: 39.4 % (ref 34–48)
HEMOGLOBIN: 12.8 G/DL (ref 11.5–15.5)
IMMATURE GRANULOCYTES #: 0.02 E9/L
IMMATURE GRANULOCYTES %: 0.3 % (ref 0–5)
LYMPHOCYTES ABSOLUTE: 2.48 E9/L (ref 1.5–4)
LYMPHOCYTES RELATIVE PERCENT: 37.6 % (ref 20–42)
MCH RBC QN AUTO: 29.3 PG (ref 26–35)
MCHC RBC AUTO-ENTMCNC: 32.5 % (ref 32–34.5)
MCV RBC AUTO: 90.2 FL (ref 80–99.9)
MONOCYTES ABSOLUTE: 0.59 E9/L (ref 0.1–0.95)
MONOCYTES RELATIVE PERCENT: 8.9 % (ref 2–12)
NEUTROPHILS ABSOLUTE: 3.3 E9/L (ref 1.8–7.3)
NEUTROPHILS RELATIVE PERCENT: 50 % (ref 43–80)
PDW BLD-RTO: 13.2 FL (ref 11.5–15)
PLATELET # BLD: 239 E9/L (ref 130–450)
PMV BLD AUTO: 12.3 FL (ref 7–12)
POTASSIUM SERPL-SCNC: 4.3 MMOL/L (ref 3.5–5)
RBC # BLD: 4.37 E12/L (ref 3.5–5.5)
SODIUM BLD-SCNC: 138 MMOL/L (ref 132–146)
TOTAL PROTEIN: 6.8 G/DL (ref 6.4–8.3)
WBC # BLD: 6.6 E9/L (ref 4.5–11.5)

## 2018-10-29 PROCEDURE — 80053 COMPREHEN METABOLIC PANEL: CPT

## 2018-10-29 PROCEDURE — 85025 COMPLETE CBC W/AUTO DIFF WBC: CPT

## 2018-11-11 ENCOUNTER — HOSPITAL ENCOUNTER (OUTPATIENT)
Age: 73
Discharge: HOME | End: 2018-11-13
Payer: MEDICARE

## 2018-11-11 PROCEDURE — 84443 ASSAY THYROID STIM HORMONE: CPT

## 2018-11-11 PROCEDURE — 84439 ASSAY OF FREE THYROXINE: CPT

## 2018-11-12 LAB
T4 FREE: 1.12 NG/DL (ref 0.93–1.7)
T4 TOTAL: 6.4 MCG/DL (ref 4.5–11.7)
TSH SERPL DL<=0.05 MIU/L-ACNC: 6.38 UIU/ML (ref 0.27–4.2)

## 2018-12-10 ENCOUNTER — APPOINTMENT (OUTPATIENT)
Dept: CT IMAGING | Age: 73
End: 2018-12-10
Payer: MEDICARE

## 2018-12-10 ENCOUNTER — APPOINTMENT (OUTPATIENT)
Dept: GENERAL RADIOLOGY | Age: 73
End: 2018-12-10
Payer: MEDICARE

## 2018-12-10 ENCOUNTER — HOSPITAL ENCOUNTER (EMERGENCY)
Age: 73
Discharge: HOME | End: 2018-12-10
Attending: EMERGENCY MEDICINE
Payer: MEDICARE

## 2018-12-10 VITALS
HEIGHT: 66 IN | WEIGHT: 142 LBS | RESPIRATION RATE: 16 BRPM | HEART RATE: 89 BPM | BODY MASS INDEX: 22.82 KG/M2 | DIASTOLIC BLOOD PRESSURE: 45 MMHG | SYSTOLIC BLOOD PRESSURE: 115 MMHG | TEMPERATURE: 98.2 F | OXYGEN SATURATION: 96 %

## 2018-12-10 DIAGNOSIS — R53.83 FATIGUE, UNSPECIFIED TYPE: Primary | ICD-10-CM

## 2018-12-10 DIAGNOSIS — N39.0 URINARY TRACT INFECTION WITHOUT HEMATURIA, SITE UNSPECIFIED: ICD-10-CM

## 2018-12-10 LAB
ANION GAP SERPL CALCULATED.3IONS-SCNC: 17 MMOL/L (ref 7–16)
BACTERIA: ABNORMAL /HPF
BASOPHILS ABSOLUTE: 0.03 E9/L (ref 0–0.2)
BASOPHILS RELATIVE PERCENT: 0.3 % (ref 0–2)
BILIRUBIN URINE: NEGATIVE
BLOOD, URINE: NEGATIVE
BUN BLDV-MCNC: 20 MG/DL (ref 8–23)
CALCIUM SERPL-MCNC: 9.7 MG/DL (ref 8.6–10.2)
CHLORIDE BLD-SCNC: 98 MMOL/L (ref 98–107)
CLARITY: ABNORMAL
CO2: 24 MMOL/L (ref 22–29)
COLOR: YELLOW
CREAT SERPL-MCNC: 0.9 MG/DL (ref 0.5–1)
EKG ATRIAL RATE: 87 BPM
EKG P AXIS: 50 DEGREES
EKG P-R INTERVAL: 164 MS
EKG Q-T INTERVAL: 372 MS
EKG QRS DURATION: 70 MS
EKG QTC CALCULATION (BAZETT): 447 MS
EKG R AXIS: 30 DEGREES
EKG T AXIS: 47 DEGREES
EKG VENTRICULAR RATE: 87 BPM
EOSINOPHILS ABSOLUTE: 0.11 E9/L (ref 0.05–0.5)
EOSINOPHILS RELATIVE PERCENT: 1.2 % (ref 0–6)
GFR AFRICAN AMERICAN: >60
GFR NON-AFRICAN AMERICAN: >60 ML/MIN/1.73
GLUCOSE BLD-MCNC: 102 MG/DL (ref 74–99)
GLUCOSE URINE: NEGATIVE MG/DL
HCT VFR BLD CALC: 36.9 % (ref 34–48)
HEMOGLOBIN: 12.2 G/DL (ref 11.5–15.5)
IMMATURE GRANULOCYTES #: 0.02 E9/L
IMMATURE GRANULOCYTES %: 0.2 % (ref 0–5)
KETONES, URINE: NEGATIVE MG/DL
LACTIC ACID: 3.3 MMOL/L (ref 0.5–2.2)
LEUKOCYTE ESTERASE, URINE: ABNORMAL
LYMPHOCYTES ABSOLUTE: 2.42 E9/L (ref 1.5–4)
LYMPHOCYTES RELATIVE PERCENT: 27.3 % (ref 20–42)
MCH RBC QN AUTO: 30 PG (ref 26–35)
MCHC RBC AUTO-ENTMCNC: 33.1 % (ref 32–34.5)
MCV RBC AUTO: 90.7 FL (ref 80–99.9)
MONOCYTES ABSOLUTE: 0.71 E9/L (ref 0.1–0.95)
MONOCYTES RELATIVE PERCENT: 8 % (ref 2–12)
NEUTROPHILS ABSOLUTE: 5.59 E9/L (ref 1.8–7.3)
NEUTROPHILS RELATIVE PERCENT: 63 % (ref 43–80)
NITRITE, URINE: POSITIVE
PDW BLD-RTO: 13.9 FL (ref 11.5–15)
PH UA: 6 (ref 5–9)
PLATELET # BLD: 241 E9/L (ref 130–450)
PMV BLD AUTO: 11 FL (ref 7–12)
POTASSIUM SERPL-SCNC: 4.4 MMOL/L (ref 3.5–5)
PRO-BNP: 40 PG/ML (ref 0–450)
PROTEIN UA: NEGATIVE MG/DL
RBC # BLD: 4.07 E12/L (ref 3.5–5.5)
RBC UA: ABNORMAL /HPF (ref 0–2)
SODIUM BLD-SCNC: 139 MMOL/L (ref 132–146)
SPECIFIC GRAVITY UA: 1.01 (ref 1–1.03)
TROPONIN: <0.01 NG/ML (ref 0–0.03)
UROBILINOGEN, URINE: 0.2 E.U./DL
WBC # BLD: 8.9 E9/L (ref 4.5–11.5)
WBC UA: ABNORMAL /HPF (ref 0–5)

## 2018-12-10 PROCEDURE — 81001 URINALYSIS AUTO W/SCOPE: CPT

## 2018-12-10 PROCEDURE — 87186 SC STD MICRODIL/AGAR DIL: CPT

## 2018-12-10 PROCEDURE — 85025 COMPLETE CBC W/AUTO DIFF WBC: CPT

## 2018-12-10 PROCEDURE — 99285 EMERGENCY DEPT VISIT HI MDM: CPT

## 2018-12-10 PROCEDURE — 6360000002 HC RX W HCPCS: Performed by: EMERGENCY MEDICINE

## 2018-12-10 PROCEDURE — 70450 CT HEAD/BRAIN W/O DYE: CPT

## 2018-12-10 PROCEDURE — 2580000003 HC RX 258: Performed by: EMERGENCY MEDICINE

## 2018-12-10 PROCEDURE — 71046 X-RAY EXAM CHEST 2 VIEWS: CPT

## 2018-12-10 PROCEDURE — 83605 ASSAY OF LACTIC ACID: CPT

## 2018-12-10 PROCEDURE — 87077 CULTURE AEROBIC IDENTIFY: CPT

## 2018-12-10 PROCEDURE — 96365 THER/PROPH/DIAG IV INF INIT: CPT

## 2018-12-10 PROCEDURE — 80048 BASIC METABOLIC PNL TOTAL CA: CPT

## 2018-12-10 PROCEDURE — 83880 ASSAY OF NATRIURETIC PEPTIDE: CPT

## 2018-12-10 PROCEDURE — 84484 ASSAY OF TROPONIN QUANT: CPT

## 2018-12-10 PROCEDURE — 87088 URINE BACTERIA CULTURE: CPT

## 2018-12-10 RX ORDER — ONDANSETRON 4 MG/1
4 TABLET, FILM COATED ORAL EVERY 8 HOURS PRN
Qty: 12 TABLET | Refills: 0 | Status: SHIPPED | OUTPATIENT
Start: 2018-12-10 | End: 2018-12-15

## 2018-12-10 RX ORDER — 0.9 % SODIUM CHLORIDE 0.9 %
500 INTRAVENOUS SOLUTION INTRAVENOUS ONCE
Status: COMPLETED | OUTPATIENT
Start: 2018-12-10 | End: 2018-12-10

## 2018-12-10 RX ORDER — LANOLIN ALCOHOL/MO/W.PET/CERES
1000 CREAM (GRAM) TOPICAL EVERY MORNING
COMMUNITY

## 2018-12-10 RX ORDER — 0.9 % SODIUM CHLORIDE 0.9 %
1000 INTRAVENOUS SOLUTION INTRAVENOUS ONCE
Status: COMPLETED | OUTPATIENT
Start: 2018-12-10 | End: 2018-12-10

## 2018-12-10 RX ORDER — CEFDINIR 300 MG/1
300 CAPSULE ORAL 2 TIMES DAILY
Qty: 14 CAPSULE | Refills: 0 | Status: SHIPPED | OUTPATIENT
Start: 2018-12-10 | End: 2018-12-17

## 2018-12-10 RX ADMIN — SODIUM CHLORIDE 500 ML: 9 INJECTION, SOLUTION INTRAVENOUS at 18:32

## 2018-12-10 RX ADMIN — SODIUM CHLORIDE 500 ML: 9 INJECTION, SOLUTION INTRAVENOUS at 18:31

## 2018-12-10 RX ADMIN — CEFTRIAXONE SODIUM 1 G: 1 INJECTION, POWDER, FOR SOLUTION INTRAMUSCULAR; INTRAVENOUS at 18:30

## 2018-12-10 RX ADMIN — SODIUM CHLORIDE 1000 ML: 9 INJECTION, SOLUTION INTRAVENOUS at 16:31

## 2018-12-12 LAB
ORGANISM: ABNORMAL
URINE CULTURE, ROUTINE: ABNORMAL
URINE CULTURE, ROUTINE: ABNORMAL

## 2019-01-01 ENCOUNTER — APPOINTMENT (OUTPATIENT)
Dept: GENERAL RADIOLOGY | Age: 74
End: 2019-01-01
Payer: MEDICARE

## 2019-01-01 ENCOUNTER — APPOINTMENT (OUTPATIENT)
Dept: CT IMAGING | Age: 74
End: 2019-01-01
Payer: MEDICARE

## 2019-01-01 ENCOUNTER — TELEPHONE (OUTPATIENT)
Dept: FAMILY MEDICINE CLINIC | Age: 74
End: 2019-01-01

## 2019-01-01 ENCOUNTER — HOSPITAL ENCOUNTER (EMERGENCY)
Age: 74
Discharge: HOME | End: 2019-11-24
Attending: EMERGENCY MEDICINE
Payer: MEDICARE

## 2019-01-01 ENCOUNTER — APPOINTMENT (OUTPATIENT)
Dept: GENERAL RADIOLOGY | Age: 74
DRG: 637 | End: 2019-01-01
Payer: MEDICARE

## 2019-01-01 ENCOUNTER — HOSPITAL ENCOUNTER (OUTPATIENT)
Age: 74
Setting detail: OBSERVATION
Discharge: HOME | End: 2019-11-13
Attending: EMERGENCY MEDICINE | Admitting: INTERNAL MEDICINE
Payer: MEDICARE

## 2019-01-01 ENCOUNTER — HOSPITAL ENCOUNTER (EMERGENCY)
Age: 74
Discharge: HOME | End: 2019-04-30
Attending: EMERGENCY MEDICINE
Payer: MEDICARE

## 2019-01-01 ENCOUNTER — HOSPITAL ENCOUNTER (OUTPATIENT)
Age: 74
Discharge: HOME | End: 2019-12-12
Payer: MEDICARE

## 2019-01-01 ENCOUNTER — OFFICE VISIT (OUTPATIENT)
Dept: FAMILY MEDICINE CLINIC | Age: 74
End: 2019-01-01
Payer: MEDICARE

## 2019-01-01 ENCOUNTER — APPOINTMENT (OUTPATIENT)
Dept: CT IMAGING | Age: 74
DRG: 637 | End: 2019-01-01
Payer: MEDICARE

## 2019-01-01 ENCOUNTER — OFFICE VISIT (OUTPATIENT)
Dept: ENDOCRINOLOGY | Age: 74
End: 2019-01-01
Payer: MEDICARE

## 2019-01-01 ENCOUNTER — HOSPITAL ENCOUNTER (OUTPATIENT)
Age: 74
Discharge: HOME | End: 2019-11-07
Payer: MEDICARE

## 2019-01-01 ENCOUNTER — APPOINTMENT (OUTPATIENT)
Dept: GENERAL RADIOLOGY | Age: 74
DRG: 639 | End: 2019-01-01
Payer: MEDICARE

## 2019-01-01 ENCOUNTER — APPOINTMENT (OUTPATIENT)
Dept: CT IMAGING | Age: 74
DRG: 639 | End: 2019-01-01
Payer: MEDICARE

## 2019-01-01 ENCOUNTER — HOSPITAL ENCOUNTER (EMERGENCY)
Age: 74
Discharge: HOME | End: 2019-09-23
Attending: EMERGENCY MEDICINE
Payer: MEDICARE

## 2019-01-01 ENCOUNTER — HOSPITAL ENCOUNTER (OUTPATIENT)
Age: 74
Discharge: HOME | End: 2019-10-23
Payer: MEDICARE

## 2019-01-01 ENCOUNTER — HOSPITAL ENCOUNTER (OUTPATIENT)
Age: 74
Discharge: HOME | End: 2019-09-05
Payer: MEDICARE

## 2019-01-01 ENCOUNTER — HOSPITAL ENCOUNTER (EMERGENCY)
Age: 74
Discharge: HOME | End: 2019-05-26
Attending: EMERGENCY MEDICINE
Payer: MEDICARE

## 2019-01-01 ENCOUNTER — HOSPITAL ENCOUNTER (INPATIENT)
Age: 74
LOS: 3 days | Discharge: NURSING, REHAB OR LONG-TERM CARE | DRG: 637 | End: 2019-12-21
Attending: EMERGENCY MEDICINE | Admitting: INTERNAL MEDICINE
Payer: MEDICARE

## 2019-01-01 ENCOUNTER — HOSPITAL ENCOUNTER (OUTPATIENT)
Age: 74
Discharge: HOME | End: 2019-12-13
Payer: MEDICARE

## 2019-01-01 ENCOUNTER — HOSPITAL ENCOUNTER (INPATIENT)
Age: 74
LOS: 1 days | Discharge: HOME | DRG: 639 | End: 2019-08-27
Attending: EMERGENCY MEDICINE | Admitting: INTERNAL MEDICINE
Payer: MEDICARE

## 2019-01-01 ENCOUNTER — TELEPHONE (OUTPATIENT)
Dept: ENDOCRINOLOGY | Age: 74
End: 2019-01-01

## 2019-01-01 ENCOUNTER — HOSPITAL ENCOUNTER (EMERGENCY)
Age: 74
Discharge: HOME | End: 2019-04-03
Attending: EMERGENCY MEDICINE
Payer: MEDICARE

## 2019-01-01 VITALS
WEIGHT: 135 LBS | TEMPERATURE: 97.5 F | DIASTOLIC BLOOD PRESSURE: 74 MMHG | BODY MASS INDEX: 22.47 KG/M2 | SYSTOLIC BLOOD PRESSURE: 114 MMHG | HEART RATE: 84 BPM | OXYGEN SATURATION: 98 %

## 2019-01-01 VITALS
DIASTOLIC BLOOD PRESSURE: 62 MMHG | WEIGHT: 126.9 LBS | BODY MASS INDEX: 21.14 KG/M2 | HEIGHT: 65 IN | OXYGEN SATURATION: 98 % | HEART RATE: 73 BPM | RESPIRATION RATE: 16 BRPM | SYSTOLIC BLOOD PRESSURE: 135 MMHG | TEMPERATURE: 97.5 F

## 2019-01-01 VITALS
SYSTOLIC BLOOD PRESSURE: 129 MMHG | WEIGHT: 142 LBS | TEMPERATURE: 97.9 F | BODY MASS INDEX: 23.66 KG/M2 | DIASTOLIC BLOOD PRESSURE: 56 MMHG | RESPIRATION RATE: 16 BRPM | HEIGHT: 65 IN | OXYGEN SATURATION: 94 % | HEART RATE: 90 BPM

## 2019-01-01 VITALS
SYSTOLIC BLOOD PRESSURE: 114 MMHG | OXYGEN SATURATION: 96 % | BODY MASS INDEX: 22.24 KG/M2 | RESPIRATION RATE: 18 BRPM | TEMPERATURE: 97.9 F | HEIGHT: 65 IN | HEART RATE: 73 BPM | DIASTOLIC BLOOD PRESSURE: 61 MMHG | WEIGHT: 133.5 LBS

## 2019-01-01 VITALS
OXYGEN SATURATION: 95 % | BODY MASS INDEX: 23.66 KG/M2 | HEART RATE: 80 BPM | RESPIRATION RATE: 14 BRPM | HEIGHT: 65 IN | TEMPERATURE: 97.7 F | WEIGHT: 142 LBS | SYSTOLIC BLOOD PRESSURE: 132 MMHG | DIASTOLIC BLOOD PRESSURE: 65 MMHG

## 2019-01-01 VITALS
HEIGHT: 65 IN | BODY MASS INDEX: 20.76 KG/M2 | SYSTOLIC BLOOD PRESSURE: 144 MMHG | TEMPERATURE: 97.3 F | DIASTOLIC BLOOD PRESSURE: 90 MMHG | OXYGEN SATURATION: 97 % | HEART RATE: 97 BPM | WEIGHT: 124.6 LBS

## 2019-01-01 VITALS
SYSTOLIC BLOOD PRESSURE: 120 MMHG | BODY MASS INDEX: 21.02 KG/M2 | HEART RATE: 84 BPM | RESPIRATION RATE: 16 BRPM | DIASTOLIC BLOOD PRESSURE: 62 MMHG | WEIGHT: 126.2 LBS | HEIGHT: 65 IN

## 2019-01-01 VITALS
TEMPERATURE: 98.5 F | RESPIRATION RATE: 16 BRPM | HEART RATE: 115 BPM | BODY MASS INDEX: 21.63 KG/M2 | HEIGHT: 65 IN | DIASTOLIC BLOOD PRESSURE: 56 MMHG | SYSTOLIC BLOOD PRESSURE: 111 MMHG | OXYGEN SATURATION: 94 % | WEIGHT: 129.8 LBS

## 2019-01-01 VITALS
TEMPERATURE: 97.8 F | SYSTOLIC BLOOD PRESSURE: 132 MMHG | BODY MASS INDEX: 21.66 KG/M2 | OXYGEN SATURATION: 99 % | DIASTOLIC BLOOD PRESSURE: 67 MMHG | WEIGHT: 130 LBS | HEART RATE: 77 BPM | RESPIRATION RATE: 16 BRPM | HEIGHT: 65 IN

## 2019-01-01 VITALS
DIASTOLIC BLOOD PRESSURE: 70 MMHG | TEMPERATURE: 98.5 F | HEART RATE: 78 BPM | OXYGEN SATURATION: 98 % | HEIGHT: 65 IN | SYSTOLIC BLOOD PRESSURE: 135 MMHG | RESPIRATION RATE: 18 BRPM | BODY MASS INDEX: 22.49 KG/M2 | WEIGHT: 135 LBS

## 2019-01-01 VITALS
SYSTOLIC BLOOD PRESSURE: 126 MMHG | OXYGEN SATURATION: 97 % | BODY MASS INDEX: 22.63 KG/M2 | TEMPERATURE: 97.7 F | HEART RATE: 74 BPM | WEIGHT: 136 LBS | DIASTOLIC BLOOD PRESSURE: 76 MMHG

## 2019-01-01 VITALS
OXYGEN SATURATION: 95 % | BODY MASS INDEX: 23.66 KG/M2 | HEART RATE: 90 BPM | DIASTOLIC BLOOD PRESSURE: 52 MMHG | SYSTOLIC BLOOD PRESSURE: 115 MMHG | WEIGHT: 142 LBS | RESPIRATION RATE: 16 BRPM | HEIGHT: 65 IN | TEMPERATURE: 98 F

## 2019-01-01 DIAGNOSIS — N30.00 ACUTE CYSTITIS WITHOUT HEMATURIA: ICD-10-CM

## 2019-01-01 DIAGNOSIS — Z79.4 TYPE 2 DIABETES MELLITUS WITHOUT COMPLICATION, WITH LONG-TERM CURRENT USE OF INSULIN (HCC): ICD-10-CM

## 2019-01-01 DIAGNOSIS — E11.649 TYPE 2 DIABETES MELLITUS WITH HYPOGLYCEMIA WITHOUT COMA, WITHOUT LONG-TERM CURRENT USE OF INSULIN (HCC): ICD-10-CM

## 2019-01-01 DIAGNOSIS — R06.00 DYSPNEA, UNSPECIFIED TYPE: Primary | ICD-10-CM
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
## 2019-01-01 DIAGNOSIS — Z86.69 HX OF PARKINSON'S DISEASE: ICD-10-CM

## 2019-01-01 DIAGNOSIS — R73.9 HYPERGLYCEMIA: ICD-10-CM

## 2019-01-01 DIAGNOSIS — E16.2 HYPOGLYCEMIA: Primary | ICD-10-CM

## 2019-01-01 DIAGNOSIS — S22.49XD CLOSED FRACTURE OF MULTIPLE RIBS WITH ROUTINE HEALING, UNSPECIFIED LATERALITY, SUBSEQUENT ENCOUNTER: ICD-10-CM

## 2019-01-01 DIAGNOSIS — E11.65 TYPE 2 DIABETES MELLITUS WITH HYPERGLYCEMIA, WITHOUT LONG-TERM CURRENT USE OF INSULIN (HCC): ICD-10-CM

## 2019-01-01 DIAGNOSIS — R10.9 ABDOMINAL PAIN, UNSPECIFIED ABDOMINAL LOCATION: ICD-10-CM

## 2019-01-01 DIAGNOSIS — E11.9 CONTROLLED TYPE 2 DIABETES MELLITUS WITHOUT COMPLICATION, WITHOUT LONG-TERM CURRENT USE OF INSULIN (HCC): Primary | ICD-10-CM

## 2019-01-01 DIAGNOSIS — R07.9 CHEST PAIN, UNSPECIFIED TYPE: Primary | ICD-10-CM

## 2019-01-01 DIAGNOSIS — M25.511 ACUTE PAIN OF RIGHT SHOULDER: ICD-10-CM

## 2019-01-01 DIAGNOSIS — S22.000A CLOSED COMPRESSION FRACTURE OF THORACIC VERTEBRA, INITIAL ENCOUNTER (HCC): ICD-10-CM

## 2019-01-01 DIAGNOSIS — I10 ESSENTIAL HYPERTENSION: ICD-10-CM

## 2019-01-01 DIAGNOSIS — R30.0 DYSURIA: Primary | ICD-10-CM

## 2019-01-01 DIAGNOSIS — R19.00 INTRAABDOMINAL MASS: ICD-10-CM

## 2019-01-01 DIAGNOSIS — E78.5 HYPERLIPIDEMIA, UNSPECIFIED HYPERLIPIDEMIA TYPE: ICD-10-CM

## 2019-01-01 DIAGNOSIS — E55.9 VITAMIN D DEFICIENCY: ICD-10-CM

## 2019-01-01 DIAGNOSIS — E11.9 TYPE 2 DIABETES MELLITUS WITHOUT COMPLICATION, WITHOUT LONG-TERM CURRENT USE OF INSULIN (HCC): ICD-10-CM

## 2019-01-01 DIAGNOSIS — R30.0 DYSURIA: ICD-10-CM
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
## 2019-01-01 DIAGNOSIS — N39.0 URINARY TRACT INFECTION WITHOUT HEMATURIA, SITE UNSPECIFIED: Primary | ICD-10-CM

## 2019-01-01 DIAGNOSIS — R41.82 ALTERED MENTAL STATUS, UNSPECIFIED ALTERED MENTAL STATUS TYPE: Primary | ICD-10-CM

## 2019-01-01 DIAGNOSIS — E03.8 SUBCLINICAL HYPOTHYROIDISM: ICD-10-CM

## 2019-01-01 DIAGNOSIS — F03.90 DEMENTIA WITHOUT BEHAVIORAL DISTURBANCE, UNSPECIFIED DEMENTIA TYPE: ICD-10-CM

## 2019-01-01 DIAGNOSIS — E11.9 CONTROLLED TYPE 2 DIABETES MELLITUS WITHOUT COMPLICATION, WITHOUT LONG-TERM CURRENT USE OF INSULIN (HCC): ICD-10-CM

## 2019-01-01 DIAGNOSIS — N39.0 URINARY TRACT INFECTION IN FEMALE: ICD-10-CM

## 2019-01-01 DIAGNOSIS — Z79.4 TYPE 2 DIABETES MELLITUS WITHOUT COMPLICATION, WITH LONG-TERM CURRENT USE OF INSULIN (HCC): Primary | ICD-10-CM

## 2019-01-01 DIAGNOSIS — E11.9 TYPE 2 DIABETES MELLITUS WITHOUT COMPLICATION, WITH LONG-TERM CURRENT USE OF INSULIN (HCC): ICD-10-CM

## 2019-01-01 DIAGNOSIS — R29.6 MULTIPLE FALLS: Primary | ICD-10-CM

## 2019-01-01 DIAGNOSIS — F03.90 DEMENTIA WITHOUT BEHAVIORAL DISTURBANCE, UNSPECIFIED DEMENTIA TYPE: Primary | ICD-10-CM

## 2019-01-01 DIAGNOSIS — E03.9 HYPOTHYROIDISM, UNSPECIFIED TYPE: ICD-10-CM

## 2019-01-01 DIAGNOSIS — N30.00 ACUTE CYSTITIS WITHOUT HEMATURIA: Primary | ICD-10-CM

## 2019-01-01 DIAGNOSIS — T38.3X5A ADVERSE EFFECT OF SULFONYLUREA, INITIAL ENCOUNTER: ICD-10-CM

## 2019-01-01 DIAGNOSIS — E11.9 TYPE 2 DIABETES MELLITUS WITHOUT COMPLICATION, WITH LONG-TERM CURRENT USE OF INSULIN (HCC): Primary | ICD-10-CM

## 2019-01-01 LAB
ALBUMIN SERPL-MCNC: 3 G/DL (ref 3.5–5.2)
ALBUMIN SERPL-MCNC: 3.7 G/DL (ref 3.5–5.2)
ALBUMIN SERPL-MCNC: 3.7 G/DL (ref 3.5–5.2)
ALBUMIN SERPL-MCNC: 4.2 G/DL (ref 3.5–5.2)
ALBUMIN SERPL-MCNC: 4.4 G/DL (ref 3.5–5.2)
ALBUMIN SERPL-MCNC: 4.5 G/DL (ref 3.5–5.2)
ALBUMIN SERPL-MCNC: 4.6 G/DL (ref 3.5–5.2)
ALP BLD-CCNC: 40 U/L (ref 35–104)
ALP BLD-CCNC: 48 U/L (ref 35–104)
ALP BLD-CCNC: 50 U/L (ref 35–104)
ALP BLD-CCNC: 50 U/L (ref 35–104)
ALP BLD-CCNC: 58 U/L (ref 35–104)
ALP BLD-CCNC: 58 U/L (ref 35–104)
ALP BLD-CCNC: 61 U/L (ref 35–104)
ALP BLD-CCNC: 62 U/L (ref 35–104)
ALP BLD-CCNC: 64 U/L (ref 35–104)
ALT SERPL-CCNC: 19 U/L (ref 0–32)
ALT SERPL-CCNC: 20 U/L (ref 0–32)
ALT SERPL-CCNC: 20 U/L (ref 0–32)
ALT SERPL-CCNC: 7 U/L (ref 0–32)
ALT SERPL-CCNC: 8 U/L (ref 0–32)
ALT SERPL-CCNC: 9 U/L (ref 0–32)
ALT SERPL-CCNC: <5 U/L (ref 0–32)
ANION GAP SERPL CALCULATED.3IONS-SCNC: 10 MMOL/L (ref 7–16)
ANION GAP SERPL CALCULATED.3IONS-SCNC: 10 MMOL/L (ref 7–16)
ANION GAP SERPL CALCULATED.3IONS-SCNC: 11 MMOL/L (ref 7–16)
ANION GAP SERPL CALCULATED.3IONS-SCNC: 12 MMOL/L (ref 7–16)
ANION GAP SERPL CALCULATED.3IONS-SCNC: 12 MMOL/L (ref 7–16)
ANION GAP SERPL CALCULATED.3IONS-SCNC: 13 MMOL/L (ref 7–16)
ANION GAP SERPL CALCULATED.3IONS-SCNC: 14 MMOL/L (ref 7–16)
ANION GAP SERPL CALCULATED.3IONS-SCNC: 14 MMOL/L (ref 7–16)
ANION GAP SERPL CALCULATED.3IONS-SCNC: 8 MMOL/L (ref 7–16)
APPEARANCE FLUID: NORMAL
AST SERPL-CCNC: 15 U/L (ref 0–31)
AST SERPL-CCNC: 17 U/L (ref 0–31)
AST SERPL-CCNC: 18 U/L (ref 0–31)
AST SERPL-CCNC: 19 U/L (ref 0–31)
AST SERPL-CCNC: 20 U/L (ref 0–31)
AST SERPL-CCNC: 21 U/L (ref 0–31)
AST SERPL-CCNC: 22 U/L (ref 0–31)
AST SERPL-CCNC: 22 U/L (ref 0–31)
AST SERPL-CCNC: 23 U/L (ref 0–31)
BACTERIA: ABNORMAL /HPF
BASOPHILS ABSOLUTE: 0.02 E9/L (ref 0–0.2)
BASOPHILS ABSOLUTE: 0.03 E9/L (ref 0–0.2)
BASOPHILS ABSOLUTE: 0.04 E9/L (ref 0–0.2)
BASOPHILS ABSOLUTE: 0.05 E9/L (ref 0–0.2)
BASOPHILS ABSOLUTE: 0.05 E9/L (ref 0–0.2)
BASOPHILS ABSOLUTE: 0.06 E9/L (ref 0–0.2)
BASOPHILS ABSOLUTE: 0.07 E9/L (ref 0–0.2)
BASOPHILS RELATIVE PERCENT: 0.3 % (ref 0–2)
BASOPHILS RELATIVE PERCENT: 0.3 % (ref 0–2)
BASOPHILS RELATIVE PERCENT: 0.4 % (ref 0–2)
BASOPHILS RELATIVE PERCENT: 0.5 % (ref 0–2)
BASOPHILS RELATIVE PERCENT: 0.6 % (ref 0–2)
BASOPHILS RELATIVE PERCENT: 0.6 % (ref 0–2)
BASOPHILS RELATIVE PERCENT: 0.7 % (ref 0–2)
BASOPHILS RELATIVE PERCENT: 0.9 % (ref 0–2)
BETA-HYDROXYBUTYRATE: 0.08 MMOL/L (ref 0.02–0.27)
BETA-HYDROXYBUTYRATE: 0.12 MMOL/L (ref 0.02–0.27)
BILIRUB SERPL-MCNC: 0.2 MG/DL (ref 0–1.2)
BILIRUB SERPL-MCNC: 0.3 MG/DL (ref 0–1.2)
BILIRUB SERPL-MCNC: 0.6 MG/DL (ref 0–1.2)
BILIRUB SERPL-MCNC: 0.7 MG/DL (ref 0–1.2)
BILIRUBIN DIRECT: <0.2 MG/DL (ref 0–0.3)
BILIRUBIN URINE: NEGATIVE
BILIRUBIN, INDIRECT: NORMAL MG/DL (ref 0–1)
BILIRUBIN, POC: NORMAL
BLOOD CULTURE, ROUTINE: NORMAL
BLOOD CULTURE, ROUTINE: NORMAL
BLOOD URINE, POC: NORMAL
BLOOD, URINE: ABNORMAL
BLOOD, URINE: NEGATIVE
BUN BLDV-MCNC: 16 MG/DL (ref 8–23)
BUN BLDV-MCNC: 16 MG/DL (ref 8–23)
BUN BLDV-MCNC: 17 MG/DL (ref 8–23)
BUN BLDV-MCNC: 18 MG/DL (ref 8–23)
BUN BLDV-MCNC: 19 MG/DL (ref 8–23)
BUN BLDV-MCNC: 20 MG/DL (ref 8–23)
BUN BLDV-MCNC: 20 MG/DL (ref 8–23)
BUN BLDV-MCNC: 22 MG/DL (ref 8–23)
BUN BLDV-MCNC: 26 MG/DL (ref 8–23)
BUN BLDV-MCNC: 26 MG/DL (ref 8–23)
BUN BLDV-MCNC: 27 MG/DL (ref 8–23)
BUN BLDV-MCNC: 8 MG/DL (ref 8–23)
BUN BLDV-MCNC: 9 MG/DL (ref 8–23)
C-REACTIVE PROTEIN: 5.6 MG/DL (ref 0–0.4)
CALCIUM SERPL-MCNC: 10.1 MG/DL (ref 8.6–10.2)
CALCIUM SERPL-MCNC: 10.1 MG/DL (ref 8.6–10.2)
CALCIUM SERPL-MCNC: 10.3 MG/DL (ref 8.6–10.2)
CALCIUM SERPL-MCNC: 6.9 MG/DL (ref 8.6–10.2)
CALCIUM SERPL-MCNC: 8.6 MG/DL (ref 8.6–10.2)
CALCIUM SERPL-MCNC: 8.7 MG/DL (ref 8.6–10.2)
CALCIUM SERPL-MCNC: 9.5 MG/DL (ref 8.6–10.2)
CALCIUM SERPL-MCNC: 9.7 MG/DL (ref 8.6–10.2)
CALCIUM SERPL-MCNC: 9.7 MG/DL (ref 8.6–10.2)
CALCIUM SERPL-MCNC: 9.9 MG/DL (ref 8.6–10.2)
CHLORIDE BLD-SCNC: 100 MMOL/L (ref 98–107)
CHLORIDE BLD-SCNC: 101 MMOL/L (ref 98–107)
CHLORIDE BLD-SCNC: 103 MMOL/L (ref 98–107)
CHLORIDE BLD-SCNC: 104 MMOL/L (ref 98–107)
CHLORIDE BLD-SCNC: 106 MMOL/L (ref 98–107)
CHLORIDE BLD-SCNC: 106 MMOL/L (ref 98–107)
CHLORIDE BLD-SCNC: 107 MMOL/L (ref 98–107)
CHLORIDE BLD-SCNC: 108 MMOL/L (ref 98–107)
CHLORIDE BLD-SCNC: 116 MMOL/L (ref 98–107)
CHP ED QC CHECK: NORMAL
CHP ED QC CHECK: YES
CLARITY, POC: CLEAR
CLARITY, POC: NORMAL
CLARITY, POC: NORMAL
CLARITY: ABNORMAL
CLARITY: CLEAR
CO2: 18 MMOL/L (ref 22–29)
CO2: 22 MMOL/L (ref 22–29)
CO2: 24 MMOL/L (ref 22–29)
CO2: 24 MMOL/L (ref 22–29)
CO2: 25 MMOL/L (ref 22–29)
CO2: 26 MMOL/L (ref 22–29)
CO2: 27 MMOL/L (ref 22–29)
COLOR, POC: YELLOW
COLOR: YELLOW
CORTISOL TOTAL: 10.91 MCG/DL (ref 2.68–18.4)
CREAT SERPL-MCNC: 0.6 MG/DL (ref 0.5–1)
CREAT SERPL-MCNC: 0.7 MG/DL (ref 0.5–1)
CREAT SERPL-MCNC: 0.8 MG/DL (ref 0.5–1)
CREAT SERPL-MCNC: 0.9 MG/DL (ref 0.5–1)
CREAT SERPL-MCNC: 1 MG/DL (ref 0.5–1)
CULTURE, BLOOD 2: NORMAL
D DIMER: 435 NG/ML DDU
EKG ATRIAL RATE: 76 BPM
EKG ATRIAL RATE: 78 BPM
EKG ATRIAL RATE: 80 BPM
EKG ATRIAL RATE: 83 BPM
EKG ATRIAL RATE: 85 BPM
EKG ATRIAL RATE: 85 BPM
EKG ATRIAL RATE: 86 BPM
EKG ATRIAL RATE: 88 BPM
EKG P AXIS: 43 DEGREES
EKG P AXIS: 45 DEGREES
EKG P AXIS: 47 DEGREES
EKG P AXIS: 53 DEGREES
EKG P AXIS: 55 DEGREES
EKG P AXIS: 57 DEGREES
EKG P AXIS: 58 DEGREES
EKG P-R INTERVAL: 148 MS
EKG P-R INTERVAL: 148 MS
EKG P-R INTERVAL: 150 MS
EKG P-R INTERVAL: 154 MS
EKG P-R INTERVAL: 160 MS
EKG P-R INTERVAL: 176 MS
EKG P-R INTERVAL: 182 MS
EKG Q-T INTERVAL: 370 MS
EKG Q-T INTERVAL: 370 MS
EKG Q-T INTERVAL: 374 MS
EKG Q-T INTERVAL: 376 MS
EKG Q-T INTERVAL: 386 MS
EKG Q-T INTERVAL: 390 MS
EKG Q-T INTERVAL: 392 MS
EKG Q-T INTERVAL: 394 MS
EKG QRS DURATION: 60 MS
EKG QRS DURATION: 64 MS
EKG QRS DURATION: 64 MS
EKG QRS DURATION: 68 MS
EKG QRS DURATION: 72 MS
EKG QRS DURATION: 80 MS
EKG QRS DURATION: 80 MS
EKG QRS DURATION: 82 MS
EKG QTC CALCULATION (BAZETT): 440 MS
EKG QTC CALCULATION (BAZETT): 440 MS
EKG QTC CALCULATION (BAZETT): 441 MS
EKG QTC CALCULATION (BAZETT): 441 MS
EKG QTC CALCULATION (BAZETT): 447 MS
EKG QTC CALCULATION (BAZETT): 447 MS
EKG QTC CALCULATION (BAZETT): 454 MS
EKG QTC CALCULATION (BAZETT): 464 MS
EKG R AXIS: 31 DEGREES
EKG R AXIS: 33 DEGREES
EKG R AXIS: 36 DEGREES
EKG R AXIS: 40 DEGREES
EKG R AXIS: 40 DEGREES
EKG R AXIS: 46 DEGREES
EKG T AXIS: 49 DEGREES
EKG T AXIS: 51 DEGREES
EKG T AXIS: 54 DEGREES
EKG T AXIS: 54 DEGREES
EKG T AXIS: 58 DEGREES
EKG T AXIS: 59 DEGREES
EKG T AXIS: 63 DEGREES
EKG T AXIS: 71 DEGREES
EKG VENTRICULAR RATE: 76 BPM
EKG VENTRICULAR RATE: 78 BPM
EKG VENTRICULAR RATE: 80 BPM
EKG VENTRICULAR RATE: 83 BPM
EKG VENTRICULAR RATE: 85 BPM
EKG VENTRICULAR RATE: 85 BPM
EKG VENTRICULAR RATE: 86 BPM
EKG VENTRICULAR RATE: 88 BPM
EOSINOPHILS ABSOLUTE: 0.04 E9/L (ref 0.05–0.5)
EOSINOPHILS ABSOLUTE: 0.05 E9/L (ref 0.05–0.5)
EOSINOPHILS ABSOLUTE: 0.07 E9/L (ref 0.05–0.5)
EOSINOPHILS ABSOLUTE: 0.07 E9/L (ref 0.05–0.5)
EOSINOPHILS ABSOLUTE: 0.09 E9/L (ref 0.05–0.5)
EOSINOPHILS ABSOLUTE: 0.12 E9/L (ref 0.05–0.5)
EOSINOPHILS ABSOLUTE: 0.14 E9/L (ref 0.05–0.5)
EOSINOPHILS ABSOLUTE: 0.22 E9/L (ref 0.05–0.5)
EOSINOPHILS ABSOLUTE: 0.28 E9/L (ref 0.05–0.5)
EOSINOPHILS ABSOLUTE: 0.37 E9/L (ref 0.05–0.5)
EOSINOPHILS ABSOLUTE: 0.48 E9/L (ref 0.05–0.5)
EOSINOPHILS RELATIVE PERCENT: 0.4 % (ref 0–6)
EOSINOPHILS RELATIVE PERCENT: 0.7 % (ref 0–6)
EOSINOPHILS RELATIVE PERCENT: 0.9 % (ref 0–6)
EOSINOPHILS RELATIVE PERCENT: 1 % (ref 0–6)
EOSINOPHILS RELATIVE PERCENT: 1.1 % (ref 0–6)
EOSINOPHILS RELATIVE PERCENT: 1.5 % (ref 0–6)
EOSINOPHILS RELATIVE PERCENT: 1.5 % (ref 0–6)
EOSINOPHILS RELATIVE PERCENT: 2.3 % (ref 0–6)
EOSINOPHILS RELATIVE PERCENT: 2.4 % (ref 0–6)
EOSINOPHILS RELATIVE PERCENT: 3.4 % (ref 0–6)
EOSINOPHILS RELATIVE PERCENT: 3.6 % (ref 0–6)
EOSINOPHILS RELATIVE PERCENT: 3.9 % (ref 0–6)
EOSINOPHILS RELATIVE PERCENT: 6.3 % (ref 0–6)
EPITHELIAL CELLS, UA: ABNORMAL /HPF
FOLATE: >20 NG/ML (ref 4.8–24.2)
GFR AFRICAN AMERICAN: >60
GFR NON-AFRICAN AMERICAN: 54 ML/MIN/1.73
GFR NON-AFRICAN AMERICAN: >60 ML/MIN/1.73
GLUCOSE BLD-MCNC: 105 MG/DL (ref 74–99)
GLUCOSE BLD-MCNC: 125 MG/DL
GLUCOSE BLD-MCNC: 126 MG/DL
GLUCOSE BLD-MCNC: 132 MG/DL (ref 74–99)
GLUCOSE BLD-MCNC: 142 MG/DL
GLUCOSE BLD-MCNC: 160 MG/DL (ref 74–99)
GLUCOSE BLD-MCNC: 181 MG/DL (ref 74–99)
GLUCOSE BLD-MCNC: 187 MG/DL (ref 74–99)
GLUCOSE BLD-MCNC: 235 MG/DL
GLUCOSE BLD-MCNC: 336 MG/DL (ref 74–99)
GLUCOSE BLD-MCNC: 41 MG/DL (ref 74–99)
GLUCOSE BLD-MCNC: 414 MG/DL (ref 74–99)
GLUCOSE BLD-MCNC: 441 MG/DL
GLUCOSE BLD-MCNC: 53 MG/DL (ref 74–99)
GLUCOSE BLD-MCNC: 55 MG/DL
GLUCOSE BLD-MCNC: 69 MG/DL
GLUCOSE BLD-MCNC: 87 MG/DL (ref 74–99)
GLUCOSE BLD-MCNC: 88 MG/DL (ref 74–99)
GLUCOSE BLD-MCNC: 97 MG/DL (ref 74–99)
GLUCOSE BLD-MCNC: 99 MG/DL (ref 74–99)
GLUCOSE URINE, POC: NORMAL
GLUCOSE URINE: 100 MG/DL
GLUCOSE URINE: 100 MG/DL
GLUCOSE URINE: 250 MG/DL
GLUCOSE URINE: 500 MG/DL
GLUCOSE URINE: >=1000 MG/DL
GLUCOSE URINE: NEGATIVE MG/DL
HBA1C MFR BLD: 6.1 %
HBA1C MFR BLD: 7.4 % (ref 4–5.6)
HBA1C MFR BLD: 7.9 % (ref 4–5.6)
HCT VFR BLD CALC: 28.3 % (ref 34–48)
HCT VFR BLD CALC: 33.5 % (ref 34–48)
HCT VFR BLD CALC: 33.7 % (ref 34–48)
HCT VFR BLD CALC: 36.8 % (ref 34–48)
HCT VFR BLD CALC: 36.8 % (ref 34–48)
HCT VFR BLD CALC: 37.5 % (ref 34–48)
HCT VFR BLD CALC: 37.6 % (ref 34–48)
HCT VFR BLD CALC: 37.7 % (ref 34–48)
HCT VFR BLD CALC: 38.3 % (ref 34–48)
HCT VFR BLD CALC: 38.4 % (ref 34–48)
HCT VFR BLD CALC: 39.5 % (ref 34–48)
HCT VFR BLD CALC: 39.6 % (ref 34–48)
HCT VFR BLD CALC: 39.7 % (ref 34–48)
HEMOGLOBIN: 10.9 G/DL (ref 11.5–15.5)
HEMOGLOBIN: 11.1 G/DL (ref 11.5–15.5)
HEMOGLOBIN: 11.9 G/DL (ref 11.5–15.5)
HEMOGLOBIN: 12 G/DL (ref 11.5–15.5)
HEMOGLOBIN: 12.1 G/DL (ref 11.5–15.5)
HEMOGLOBIN: 12.2 G/DL (ref 11.5–15.5)
HEMOGLOBIN: 12.2 G/DL (ref 11.5–15.5)
HEMOGLOBIN: 12.4 G/DL (ref 11.5–15.5)
HEMOGLOBIN: 12.7 G/DL (ref 11.5–15.5)
HEMOGLOBIN: 12.8 G/DL (ref 11.5–15.5)
HEMOGLOBIN: 12.9 G/DL (ref 11.5–15.5)
HEMOGLOBIN: 13.2 G/DL (ref 11.5–15.5)
HEMOGLOBIN: 9.2 G/DL (ref 11.5–15.5)
IMMATURE GRANULOCYTES #: 0.01 E9/L
IMMATURE GRANULOCYTES #: 0.02 E9/L
IMMATURE GRANULOCYTES #: 0.03 E9/L
IMMATURE GRANULOCYTES #: 0.05 E9/L
IMMATURE GRANULOCYTES %: 0.1 % (ref 0–5)
IMMATURE GRANULOCYTES %: 0.1 % (ref 0–5)
IMMATURE GRANULOCYTES %: 0.2 % (ref 0–5)
IMMATURE GRANULOCYTES %: 0.3 % (ref 0–5)
IMMATURE GRANULOCYTES %: 0.4 % (ref 0–5)
INR BLD: 0.9
KETONES, POC: NORMAL
KETONES, URINE: ABNORMAL MG/DL
KETONES, URINE: NEGATIVE MG/DL
LACTIC ACID, SEPSIS: 1.9 MMOL/L (ref 0.5–1.9)
LACTIC ACID, SEPSIS: 2.5 MMOL/L (ref 0.5–1.9)
LACTIC ACID, SEPSIS: 2.6 MMOL/L (ref 0.5–1.9)
LACTIC ACID: 1.3 MMOL/L (ref 0.5–2.2)
LACTIC ACID: 1.7 MMOL/L (ref 0.5–2.2)
LACTIC ACID: 1.9 MMOL/L (ref 0.5–2.2)
LEUKOCYTE EST, POC: NORMAL
LEUKOCYTE ESTERASE, URINE: ABNORMAL
LEUKOCYTE ESTERASE, URINE: NEGATIVE
LIPASE: 52 U/L (ref 13–60)
LIPASE: 56 U/L (ref 13–60)
LIPASE: 65 U/L (ref 13–60)
LYMPHOCYTES ABSOLUTE: 1.36 E9/L (ref 1.5–4)
LYMPHOCYTES ABSOLUTE: 1.66 E9/L (ref 1.5–4)
LYMPHOCYTES ABSOLUTE: 1.82 E9/L (ref 1.5–4)
LYMPHOCYTES ABSOLUTE: 1.89 E9/L (ref 1.5–4)
LYMPHOCYTES ABSOLUTE: 1.91 E9/L (ref 1.5–4)
LYMPHOCYTES ABSOLUTE: 2.04 E9/L (ref 1.5–4)
LYMPHOCYTES ABSOLUTE: 2.06 E9/L (ref 1.5–4)
LYMPHOCYTES ABSOLUTE: 2.08 E9/L (ref 1.5–4)
LYMPHOCYTES ABSOLUTE: 2.11 E9/L (ref 1.5–4)
LYMPHOCYTES ABSOLUTE: 2.2 E9/L (ref 1.5–4)
LYMPHOCYTES ABSOLUTE: 2.41 E9/L (ref 1.5–4)
LYMPHOCYTES ABSOLUTE: 2.96 E9/L (ref 1.5–4)
LYMPHOCYTES ABSOLUTE: 3.05 E9/L (ref 1.5–4)
LYMPHOCYTES RELATIVE PERCENT: 14.9 % (ref 20–42)
LYMPHOCYTES RELATIVE PERCENT: 21.6 % (ref 20–42)
LYMPHOCYTES RELATIVE PERCENT: 22.1 % (ref 20–42)
LYMPHOCYTES RELATIVE PERCENT: 22.1 % (ref 20–42)
LYMPHOCYTES RELATIVE PERCENT: 23.5 % (ref 20–42)
LYMPHOCYTES RELATIVE PERCENT: 25 % (ref 20–42)
LYMPHOCYTES RELATIVE PERCENT: 25.3 % (ref 20–42)
LYMPHOCYTES RELATIVE PERCENT: 29.9 % (ref 20–42)
LYMPHOCYTES RELATIVE PERCENT: 31.9 % (ref 20–42)
LYMPHOCYTES RELATIVE PERCENT: 35.7 % (ref 20–42)
LYMPHOCYTES RELATIVE PERCENT: 36.2 % (ref 20–42)
LYMPHOCYTES RELATIVE PERCENT: 36.3 % (ref 20–42)
LYMPHOCYTES RELATIVE PERCENT: 36.5 % (ref 20–42)
MAGNESIUM: 1.2 MG/DL (ref 1.6–2.6)
MAGNESIUM: 1.7 MG/DL (ref 1.6–2.6)
MAGNESIUM: 1.7 MG/DL (ref 1.6–2.6)
MAGNESIUM: 1.8 MG/DL (ref 1.6–2.6)
MCH RBC QN AUTO: 29.6 PG (ref 26–35)
MCH RBC QN AUTO: 29.8 PG (ref 26–35)
MCH RBC QN AUTO: 29.9 PG (ref 26–35)
MCH RBC QN AUTO: 30 PG (ref 26–35)
MCH RBC QN AUTO: 30.1 PG (ref 26–35)
MCH RBC QN AUTO: 30.2 PG (ref 26–35)
MCH RBC QN AUTO: 30.2 PG (ref 26–35)
MCH RBC QN AUTO: 30.3 PG (ref 26–35)
MCH RBC QN AUTO: 30.3 PG (ref 26–35)
MCH RBC QN AUTO: 30.6 PG (ref 26–35)
MCH RBC QN AUTO: 30.7 PG (ref 26–35)
MCH RBC QN AUTO: 30.8 PG (ref 26–35)
MCH RBC QN AUTO: 30.8 PG (ref 26–35)
MCHC RBC AUTO-ENTMCNC: 31.6 % (ref 32–34.5)
MCHC RBC AUTO-ENTMCNC: 31.9 % (ref 32–34.5)
MCHC RBC AUTO-ENTMCNC: 32.3 % (ref 32–34.5)
MCHC RBC AUTO-ENTMCNC: 32.3 % (ref 32–34.5)
MCHC RBC AUTO-ENTMCNC: 32.5 % (ref 32–34.5)
MCHC RBC AUTO-ENTMCNC: 32.7 % (ref 32–34.5)
MCHC RBC AUTO-ENTMCNC: 32.9 % (ref 32–34.5)
MCHC RBC AUTO-ENTMCNC: 32.9 % (ref 32–34.5)
MCHC RBC AUTO-ENTMCNC: 33.1 % (ref 32–34.5)
MCHC RBC AUTO-ENTMCNC: 33.2 % (ref 32–34.5)
MCHC RBC AUTO-ENTMCNC: 33.2 % (ref 32–34.5)
MCV RBC AUTO: 91.6 FL (ref 80–99.9)
MCV RBC AUTO: 92.2 FL (ref 80–99.9)
MCV RBC AUTO: 92.2 FL (ref 80–99.9)
MCV RBC AUTO: 92.5 FL (ref 80–99.9)
MCV RBC AUTO: 92.6 FL (ref 80–99.9)
MCV RBC AUTO: 92.8 FL (ref 80–99.9)
MCV RBC AUTO: 92.8 FL (ref 80–99.9)
MCV RBC AUTO: 93.4 FL (ref 80–99.9)
MCV RBC AUTO: 94.3 FL (ref 80–99.9)
MCV RBC AUTO: 94.3 FL (ref 80–99.9)
METER GLUCOSE: 102 MG/DL (ref 74–99)
METER GLUCOSE: 109 MG/DL (ref 74–99)
METER GLUCOSE: 114 MG/DL (ref 74–99)
METER GLUCOSE: 115 MG/DL (ref 74–99)
METER GLUCOSE: 124 MG/DL (ref 74–99)
METER GLUCOSE: 125 MG/DL (ref 74–99)
METER GLUCOSE: 126 MG/DL (ref 74–99)
METER GLUCOSE: 126 MG/DL (ref 74–99)
METER GLUCOSE: 139 MG/DL (ref 74–99)
METER GLUCOSE: 142 MG/DL (ref 74–99)
METER GLUCOSE: 149 MG/DL (ref 74–99)
METER GLUCOSE: 160 MG/DL (ref 74–99)
METER GLUCOSE: 176 MG/DL (ref 74–99)
METER GLUCOSE: 192 MG/DL (ref 74–99)
METER GLUCOSE: 194 MG/DL (ref 74–99)
METER GLUCOSE: 200 MG/DL (ref 74–99)
METER GLUCOSE: 220 MG/DL (ref 74–99)
METER GLUCOSE: 231 MG/DL (ref 74–99)
METER GLUCOSE: 235 MG/DL (ref 74–99)
METER GLUCOSE: 283 MG/DL (ref 74–99)
METER GLUCOSE: 441 MG/DL (ref 74–99)
METER GLUCOSE: 55 MG/DL (ref 74–99)
METER GLUCOSE: 66 MG/DL (ref 74–99)
METER GLUCOSE: 69 MG/DL (ref 74–99)
METER GLUCOSE: 72 MG/DL (ref 74–99)
METER GLUCOSE: 89 MG/DL (ref 74–99)
METER GLUCOSE: 90 MG/DL (ref 74–99)
METER GLUCOSE: 92 MG/DL (ref 74–99)
METER GLUCOSE: 95 MG/DL (ref 74–99)
METER GLUCOSE: 96 MG/DL (ref 74–99)
METER GLUCOSE: 97 MG/DL (ref 74–99)
MONOCYTES ABSOLUTE: 0.43 E9/L (ref 0.1–0.95)
MONOCYTES ABSOLUTE: 0.46 E9/L (ref 0.1–0.95)
MONOCYTES ABSOLUTE: 0.5 E9/L (ref 0.1–0.95)
MONOCYTES ABSOLUTE: 0.52 E9/L (ref 0.1–0.95)
MONOCYTES ABSOLUTE: 0.54 E9/L (ref 0.1–0.95)
MONOCYTES ABSOLUTE: 0.55 E9/L (ref 0.1–0.95)
MONOCYTES ABSOLUTE: 0.56 E9/L (ref 0.1–0.95)
MONOCYTES ABSOLUTE: 0.58 E9/L (ref 0.1–0.95)
MONOCYTES ABSOLUTE: 0.61 E9/L (ref 0.1–0.95)
MONOCYTES ABSOLUTE: 0.63 E9/L (ref 0.1–0.95)
MONOCYTES ABSOLUTE: 0.68 E9/L (ref 0.1–0.95)
MONOCYTES ABSOLUTE: 0.73 E9/L (ref 0.1–0.95)
MONOCYTES ABSOLUTE: 0.74 E9/L (ref 0.1–0.95)
MONOCYTES RELATIVE PERCENT: 6.1 % (ref 2–12)
MONOCYTES RELATIVE PERCENT: 6.3 % (ref 2–12)
MONOCYTES RELATIVE PERCENT: 6.6 % (ref 2–12)
MONOCYTES RELATIVE PERCENT: 6.9 % (ref 2–12)
MONOCYTES RELATIVE PERCENT: 7.1 % (ref 2–12)
MONOCYTES RELATIVE PERCENT: 7.3 % (ref 2–12)
MONOCYTES RELATIVE PERCENT: 7.3 % (ref 2–12)
MONOCYTES RELATIVE PERCENT: 7.4 % (ref 2–12)
MONOCYTES RELATIVE PERCENT: 7.6 % (ref 2–12)
MONOCYTES RELATIVE PERCENT: 7.7 % (ref 2–12)
MONOCYTES RELATIVE PERCENT: 8.3 % (ref 2–12)
MONOCYTES RELATIVE PERCENT: 9.1 % (ref 2–12)
MONOCYTES RELATIVE PERCENT: 9.5 % (ref 2–12)
NEUTROPHILS ABSOLUTE: 2.83 E9/L (ref 1.8–7.3)
NEUTROPHILS ABSOLUTE: 3.1 E9/L (ref 1.8–7.3)
NEUTROPHILS ABSOLUTE: 3.17 E9/L (ref 1.8–7.3)
NEUTROPHILS ABSOLUTE: 4.04 E9/L (ref 1.8–7.3)
NEUTROPHILS ABSOLUTE: 4.36 E9/L (ref 1.8–7.3)
NEUTROPHILS ABSOLUTE: 4.37 E9/L (ref 1.8–7.3)
NEUTROPHILS ABSOLUTE: 4.99 E9/L (ref 1.8–7.3)
NEUTROPHILS ABSOLUTE: 5 E9/L (ref 1.8–7.3)
NEUTROPHILS ABSOLUTE: 5.4 E9/L (ref 1.8–7.3)
NEUTROPHILS ABSOLUTE: 5.97 E9/L (ref 1.8–7.3)
NEUTROPHILS ABSOLUTE: 6.04 E9/L (ref 1.8–7.3)
NEUTROPHILS ABSOLUTE: 6.35 E9/L (ref 1.8–7.3)
NEUTROPHILS ABSOLUTE: 8.71 E9/L (ref 1.8–7.3)
NEUTROPHILS RELATIVE PERCENT: 49.6 % (ref 43–80)
NEUTROPHILS RELATIVE PERCENT: 52.2 % (ref 43–80)
NEUTROPHILS RELATIVE PERCENT: 52.6 % (ref 43–80)
NEUTROPHILS RELATIVE PERCENT: 53.1 % (ref 43–80)
NEUTROPHILS RELATIVE PERCENT: 53.5 % (ref 43–80)
NEUTROPHILS RELATIVE PERCENT: 58.5 % (ref 43–80)
NEUTROPHILS RELATIVE PERCENT: 64.6 % (ref 43–80)
NEUTROPHILS RELATIVE PERCENT: 65.9 % (ref 43–80)
NEUTROPHILS RELATIVE PERCENT: 66.3 % (ref 43–80)
NEUTROPHILS RELATIVE PERCENT: 69.1 % (ref 43–80)
NEUTROPHILS RELATIVE PERCENT: 69.3 % (ref 43–80)
NEUTROPHILS RELATIVE PERCENT: 69.9 % (ref 43–80)
NEUTROPHILS RELATIVE PERCENT: 77.9 % (ref 43–80)
NITRITE, POC: NORMAL
NITRITE, POC: POSITIVE
NITRITE, URINE: NEGATIVE
NITRITE, URINE: POSITIVE
NITRITE, URINE: POSITIVE
ORGANISM: ABNORMAL
PDW BLD-RTO: 13.2 FL (ref 11.5–15)
PDW BLD-RTO: 13.3 FL (ref 11.5–15)
PDW BLD-RTO: 13.4 FL (ref 11.5–15)
PDW BLD-RTO: 13.5 FL (ref 11.5–15)
PDW BLD-RTO: 13.6 FL (ref 11.5–15)
PDW BLD-RTO: 13.6 FL (ref 11.5–15)
PDW BLD-RTO: 13.8 FL (ref 11.5–15)
PDW BLD-RTO: 14.1 FL (ref 11.5–15)
PDW BLD-RTO: 14.2 FL (ref 11.5–15)
PH UA: 5 (ref 5–9)
PH UA: 5.5 (ref 5–9)
PH UA: 6 (ref 5–9)
PH VENOUS: 7.38 (ref 7.35–7.45)
PH VENOUS: 7.39 (ref 7.35–7.45)
PH, POC: 5
PH, POC: 5
PH, POC: 5.5
PHOSPHORUS: 3 MG/DL (ref 2.5–4.5)
PLATELET # BLD: 191 E9/L (ref 130–450)
PLATELET # BLD: 206 E9/L (ref 130–450)
PLATELET # BLD: 211 E9/L (ref 130–450)
PLATELET # BLD: 217 E9/L (ref 130–450)
PLATELET # BLD: 228 E9/L (ref 130–450)
PLATELET # BLD: 233 E9/L (ref 130–450)
PLATELET # BLD: 237 E9/L (ref 130–450)
PLATELET # BLD: 238 E9/L (ref 130–450)
PLATELET # BLD: 243 E9/L (ref 130–450)
PLATELET # BLD: 245 E9/L (ref 130–450)
PLATELET # BLD: 248 E9/L (ref 130–450)
PLATELET # BLD: 249 E9/L (ref 130–450)
PLATELET # BLD: 257 E9/L (ref 130–450)
PMV BLD AUTO: 10.7 FL (ref 7–12)
PMV BLD AUTO: 11.1 FL (ref 7–12)
PMV BLD AUTO: 11.2 FL (ref 7–12)
PMV BLD AUTO: 11.3 FL (ref 7–12)
PMV BLD AUTO: 11.3 FL (ref 7–12)
PMV BLD AUTO: 11.4 FL (ref 7–12)
PMV BLD AUTO: 11.4 FL (ref 7–12)
PMV BLD AUTO: 11.5 FL (ref 7–12)
PMV BLD AUTO: 11.6 FL (ref 7–12)
PMV BLD AUTO: 11.7 FL (ref 7–12)
POTASSIUM REFLEX MAGNESIUM: 2.9 MMOL/L (ref 3.5–5)
POTASSIUM REFLEX MAGNESIUM: 3.5 MMOL/L (ref 3.5–5)
POTASSIUM REFLEX MAGNESIUM: 3.9 MMOL/L (ref 3.5–5)
POTASSIUM REFLEX MAGNESIUM: 4 MMOL/L (ref 3.5–5)
POTASSIUM REFLEX MAGNESIUM: 4.3 MMOL/L (ref 3.5–5)
POTASSIUM REFLEX MAGNESIUM: 4.4 MMOL/L (ref 3.5–5)
POTASSIUM REFLEX MAGNESIUM: 4.5 MMOL/L (ref 3.5–5)
POTASSIUM REFLEX MAGNESIUM: 4.5 MMOL/L (ref 3.5–5)
POTASSIUM REFLEX MAGNESIUM: 4.7 MMOL/L (ref 3.5–5)
POTASSIUM SERPL-SCNC: 4 MMOL/L (ref 3.5–5)
POTASSIUM SERPL-SCNC: 4.3 MMOL/L (ref 3.5–5)
POTASSIUM SERPL-SCNC: 4.3 MMOL/L (ref 3.5–5)
POTASSIUM SERPL-SCNC: 4.6 MMOL/L (ref 3.5–5)
PRO-BNP: 52 PG/ML (ref 0–450)
PRO-BNP: 72 PG/ML (ref 0–450)
PRO-BNP: 88 PG/ML (ref 0–450)
PROTEIN UA: NEGATIVE MG/DL
PROTEIN, POC: NORMAL
PROTHROMBIN TIME: 11.2 SEC (ref 9.3–12.4)
RBC # BLD: 3.07 E12/L (ref 3.5–5.5)
RBC # BLD: 3.62 E12/L (ref 3.5–5.5)
RBC # BLD: 3.63 E12/L (ref 3.5–5.5)
RBC # BLD: 3.94 E12/L (ref 3.5–5.5)
RBC # BLD: 3.98 E12/L (ref 3.5–5.5)
RBC # BLD: 4.04 E12/L (ref 3.5–5.5)
RBC # BLD: 4.06 E12/L (ref 3.5–5.5)
RBC # BLD: 4.06 E12/L (ref 3.5–5.5)
RBC # BLD: 4.09 E12/L (ref 3.5–5.5)
RBC # BLD: 4.19 E12/L (ref 3.5–5.5)
RBC # BLD: 4.19 E12/L (ref 3.5–5.5)
RBC # BLD: 4.28 E12/L (ref 3.5–5.5)
RBC # BLD: 4.29 E12/L (ref 3.5–5.5)
RBC UA: ABNORMAL /HPF (ref 0–2)
SEDIMENTATION RATE, ERYTHROCYTE: 29 MM/HR (ref 0–20)
SODIUM BLD-SCNC: 136 MMOL/L (ref 132–146)
SODIUM BLD-SCNC: 139 MMOL/L (ref 132–146)
SODIUM BLD-SCNC: 139 MMOL/L (ref 132–146)
SODIUM BLD-SCNC: 140 MMOL/L (ref 132–146)
SODIUM BLD-SCNC: 140 MMOL/L (ref 132–146)
SODIUM BLD-SCNC: 141 MMOL/L (ref 132–146)
SODIUM BLD-SCNC: 142 MMOL/L (ref 132–146)
SODIUM BLD-SCNC: 142 MMOL/L (ref 132–146)
SODIUM BLD-SCNC: 143 MMOL/L (ref 132–146)
SPECIFIC GRAVITY UA: 1.01 (ref 1–1.03)
SPECIFIC GRAVITY UA: 1.01 (ref 1–1.03)
SPECIFIC GRAVITY UA: 1.02 (ref 1–1.03)
SPECIFIC GRAVITY, POC: 1.02
SPECIFIC GRAVITY, POC: >=1.03
SPECIFIC GRAVITY, POC: >=1.03
TOTAL PROTEIN: 4.8 G/DL (ref 6.4–8.3)
TOTAL PROTEIN: 6 G/DL (ref 6.4–8.3)
TOTAL PROTEIN: 6.2 G/DL (ref 6.4–8.3)
TOTAL PROTEIN: 6.9 G/DL (ref 6.4–8.3)
TOTAL PROTEIN: 7.1 G/DL (ref 6.4–8.3)
TOTAL PROTEIN: 7.1 G/DL (ref 6.4–8.3)
TOTAL PROTEIN: 7.4 G/DL (ref 6.4–8.3)
TOTAL PROTEIN: 7.5 G/DL (ref 6.4–8.3)
TOTAL PROTEIN: 7.8 G/DL (ref 6.4–8.3)
TROPONIN: <0.01 NG/ML (ref 0–0.03)
TSH SERPL DL<=0.05 MIU/L-ACNC: 2.7 UIU/ML (ref 0.27–4.2)
TSH SERPL DL<=0.05 MIU/L-ACNC: 3.65 UIU/ML (ref 0.27–4.2)
URINE CULTURE, ROUTINE: ABNORMAL
URINE CULTURE, ROUTINE: NORMAL
UROBILINOGEN, POC: 0.2
UROBILINOGEN, URINE: 0.2 E.U./DL
VITAMIN B-12: 1350 PG/ML (ref 211–946)
WBC # BLD: 10.2 E9/L (ref 4.5–11.5)
WBC # BLD: 11.2 E9/L (ref 4.5–11.5)
WBC # BLD: 5.7 E9/L (ref 4.5–11.5)
WBC # BLD: 5.8 E9/L (ref 4.5–11.5)
WBC # BLD: 6.1 E9/L (ref 4.5–11.5)
WBC # BLD: 6.3 E9/L (ref 4.5–11.5)
WBC # BLD: 7.6 E9/L (ref 4.5–11.5)
WBC # BLD: 7.6 E9/L (ref 4.5–11.5)
WBC # BLD: 7.8 E9/L (ref 4.5–11.5)
WBC # BLD: 8.1 E9/L (ref 4.5–11.5)
WBC # BLD: 8.3 E9/L (ref 4.5–11.5)
WBC # BLD: 8.6 E9/L (ref 4.5–11.5)
WBC # BLD: 9.2 E9/L (ref 4.5–11.5)
WBC UA: ABNORMAL /HPF (ref 0–5)
YEAST: ABNORMAL

## 2019-01-01 PROCEDURE — 71045 X-RAY EXAM CHEST 1 VIEW: CPT

## 2019-01-01 PROCEDURE — 36415 COLL VENOUS BLD VENIPUNCTURE: CPT

## 2019-01-01 PROCEDURE — 83690 ASSAY OF LIPASE: CPT

## 2019-01-01 PROCEDURE — 99221 1ST HOSP IP/OBS SF/LOW 40: CPT | Performed by: INTERNAL MEDICINE

## 2019-01-01 PROCEDURE — 2500000003 HC RX 250 WO HCPCS: Performed by: RADIOLOGY

## 2019-01-01 PROCEDURE — 3045F PR MOST RECENT HEMOGLOBIN A1C LEVEL 7.0-9.0%: CPT | Performed by: FAMILY MEDICINE

## 2019-01-01 PROCEDURE — 81001 URINALYSIS AUTO W/SCOPE: CPT

## 2019-01-01 PROCEDURE — 99239 HOSP IP/OBS DSCHRG MGMT >30: CPT | Performed by: INTERNAL MEDICINE

## 2019-01-01 PROCEDURE — 1036F TOBACCO NON-USER: CPT | Performed by: INTERNAL MEDICINE

## 2019-01-01 PROCEDURE — 87186 SC STD MICRODIL/AGAR DIL: CPT

## 2019-01-01 PROCEDURE — 83036 HEMOGLOBIN GLYCOSYLATED A1C: CPT

## 2019-01-01 PROCEDURE — 87088 URINE BACTERIA CULTURE: CPT

## 2019-01-01 PROCEDURE — 84443 ASSAY THYROID STIM HORMONE: CPT

## 2019-01-01 PROCEDURE — G0378 HOSPITAL OBSERVATION PER HR: HCPCS

## 2019-01-01 PROCEDURE — 93010 ELECTROCARDIOGRAM REPORT: CPT | Performed by: INTERNAL MEDICINE

## 2019-01-01 PROCEDURE — 6360000002 HC RX W HCPCS: Performed by: INTERNAL MEDICINE

## 2019-01-01 PROCEDURE — 99232 SBSQ HOSP IP/OBS MODERATE 35: CPT | Performed by: INTERNAL MEDICINE

## 2019-01-01 PROCEDURE — 2580000003 HC RX 258: Performed by: EMERGENCY MEDICINE

## 2019-01-01 PROCEDURE — 84484 ASSAY OF TROPONIN QUANT: CPT

## 2019-01-01 PROCEDURE — APPSS45 APP SPLIT SHARED TIME 31-45 MINUTES: Performed by: PHYSICIAN ASSISTANT

## 2019-01-01 PROCEDURE — 85025 COMPLETE CBC W/AUTO DIFF WBC: CPT

## 2019-01-01 PROCEDURE — 6360000002 HC RX W HCPCS: Performed by: EMERGENCY MEDICINE

## 2019-01-01 PROCEDURE — 80053 COMPREHEN METABOLIC PANEL: CPT

## 2019-01-01 PROCEDURE — 6370000000 HC RX 637 (ALT 250 FOR IP): Performed by: INTERNAL MEDICINE

## 2019-01-01 PROCEDURE — 80048 BASIC METABOLIC PNL TOTAL CA: CPT

## 2019-01-01 PROCEDURE — 6360000002 HC RX W HCPCS: Performed by: PHYSICIAN ASSISTANT

## 2019-01-01 PROCEDURE — G8400 PT W/DXA NO RESULTS DOC: HCPCS | Performed by: FAMILY MEDICINE

## 2019-01-01 PROCEDURE — 71046 X-RAY EXAM CHEST 2 VIEWS: CPT

## 2019-01-01 PROCEDURE — 85610 PROTHROMBIN TIME: CPT

## 2019-01-01 PROCEDURE — 3044F HG A1C LEVEL LT 7.0%: CPT | Performed by: INTERNAL MEDICINE

## 2019-01-01 PROCEDURE — 84100 ASSAY OF PHOSPHORUS: CPT

## 2019-01-01 PROCEDURE — 83605 ASSAY OF LACTIC ACID: CPT

## 2019-01-01 PROCEDURE — 85378 FIBRIN DEGRADE SEMIQUANT: CPT

## 2019-01-01 PROCEDURE — 82746 ASSAY OF FOLIC ACID SERUM: CPT

## 2019-01-01 PROCEDURE — 1200000000 HC SEMI PRIVATE

## 2019-01-01 PROCEDURE — 2580000003 HC RX 258: Performed by: RADIOLOGY

## 2019-01-01 PROCEDURE — 99214 OFFICE O/P EST MOD 30 MIN: CPT | Performed by: FAMILY MEDICINE

## 2019-01-01 PROCEDURE — 99284 EMERGENCY DEPT VISIT MOD MDM: CPT

## 2019-01-01 PROCEDURE — 92611 MOTION FLUOROSCOPY/SWALLOW: CPT | Performed by: SPEECH-LANGUAGE PATHOLOGIST

## 2019-01-01 PROCEDURE — 93005 ELECTROCARDIOGRAM TRACING: CPT | Performed by: EMERGENCY MEDICINE

## 2019-01-01 PROCEDURE — 2060000000 HC ICU INTERMEDIATE R&B

## 2019-01-01 PROCEDURE — 99285 EMERGENCY DEPT VISIT HI MDM: CPT

## 2019-01-01 PROCEDURE — 70450 CT HEAD/BRAIN W/O DYE: CPT

## 2019-01-01 PROCEDURE — 1090F PRES/ABSN URINE INCON ASSESS: CPT | Performed by: INTERNAL MEDICINE

## 2019-01-01 PROCEDURE — 6360000004 HC RX CONTRAST MEDICATION: Performed by: RADIOLOGY

## 2019-01-01 PROCEDURE — 83735 ASSAY OF MAGNESIUM: CPT

## 2019-01-01 PROCEDURE — 82533 TOTAL CORTISOL: CPT

## 2019-01-01 PROCEDURE — 72125 CT NECK SPINE W/O DYE: CPT

## 2019-01-01 PROCEDURE — 99215 OFFICE O/P EST HI 40 MIN: CPT | Performed by: FAMILY MEDICINE

## 2019-01-01 PROCEDURE — 2580000003 HC RX 258: Performed by: INTERNAL MEDICINE

## 2019-01-01 PROCEDURE — 6370000000 HC RX 637 (ALT 250 FOR IP): Performed by: EMERGENCY MEDICINE

## 2019-01-01 PROCEDURE — 74177 CT ABD & PELVIS W/CONTRAST: CPT

## 2019-01-01 PROCEDURE — 81002 URINALYSIS NONAUTO W/O SCOPE: CPT | Performed by: FAMILY MEDICINE

## 2019-01-01 PROCEDURE — 1090F PRES/ABSN URINE INCON ASSESS: CPT | Performed by: FAMILY MEDICINE

## 2019-01-01 PROCEDURE — 2580000003 HC RX 258: Performed by: PHYSICIAN ASSISTANT

## 2019-01-01 PROCEDURE — 82962 GLUCOSE BLOOD TEST: CPT

## 2019-01-01 PROCEDURE — 97165 OT EVAL LOW COMPLEX 30 MIN: CPT

## 2019-01-01 PROCEDURE — 4040F PNEUMOC VAC/ADMIN/RCVD: CPT | Performed by: FAMILY MEDICINE

## 2019-01-01 PROCEDURE — 6370000000 HC RX 637 (ALT 250 FOR IP): Performed by: PHYSICIAN ASSISTANT

## 2019-01-01 PROCEDURE — 97161 PT EVAL LOW COMPLEX 20 MIN: CPT

## 2019-01-01 PROCEDURE — G8400 PT W/DXA NO RESULTS DOC: HCPCS | Performed by: INTERNAL MEDICINE

## 2019-01-01 PROCEDURE — 83880 ASSAY OF NATRIURETIC PEPTIDE: CPT

## 2019-01-01 PROCEDURE — 1036F TOBACCO NON-USER: CPT | Performed by: FAMILY MEDICINE

## 2019-01-01 PROCEDURE — 1111F DSCHRG MED/CURRENT MED MERGE: CPT | Performed by: INTERNAL MEDICINE

## 2019-01-01 PROCEDURE — 83036 HEMOGLOBIN GLYCOSYLATED A1C: CPT | Performed by: INTERNAL MEDICINE

## 2019-01-01 PROCEDURE — 80076 HEPATIC FUNCTION PANEL: CPT

## 2019-01-01 PROCEDURE — G8427 DOCREV CUR MEDS BY ELIG CLIN: HCPCS | Performed by: INTERNAL MEDICINE

## 2019-01-01 PROCEDURE — G8482 FLU IMMUNIZE ORDER/ADMIN: HCPCS | Performed by: FAMILY MEDICINE

## 2019-01-01 PROCEDURE — 82010 KETONE BODYS QUAN: CPT

## 2019-01-01 PROCEDURE — 96365 THER/PROPH/DIAG IV INF INIT: CPT

## 2019-01-01 PROCEDURE — 73030 X-RAY EXAM OF SHOULDER: CPT

## 2019-01-01 PROCEDURE — 3017F COLORECTAL CA SCREEN DOC REV: CPT | Performed by: FAMILY MEDICINE

## 2019-01-01 PROCEDURE — 99213 OFFICE O/P EST LOW 20 MIN: CPT | Performed by: FAMILY MEDICINE

## 2019-01-01 PROCEDURE — 74230 X-RAY XM SWLNG FUNCJ C+: CPT

## 2019-01-01 PROCEDURE — 97530 THERAPEUTIC ACTIVITIES: CPT

## 2019-01-01 PROCEDURE — 99214 OFFICE O/P EST MOD 30 MIN: CPT | Performed by: INTERNAL MEDICINE

## 2019-01-01 PROCEDURE — 96374 THER/PROPH/DIAG INJ IV PUSH: CPT

## 2019-01-01 PROCEDURE — G8420 CALC BMI NORM PARAMETERS: HCPCS | Performed by: INTERNAL MEDICINE

## 2019-01-01 PROCEDURE — 82800 BLOOD PH: CPT

## 2019-01-01 PROCEDURE — 94664 DEMO&/EVAL PT USE INHALER: CPT

## 2019-01-01 PROCEDURE — 3017F COLORECTAL CA SCREEN DOC REV: CPT | Performed by: INTERNAL MEDICINE

## 2019-01-01 PROCEDURE — 2022F DILAT RTA XM EVC RTNOPTHY: CPT | Performed by: FAMILY MEDICINE

## 2019-01-01 PROCEDURE — 94760 N-INVAS EAR/PLS OXIMETRY 1: CPT

## 2019-01-01 PROCEDURE — 86140 C-REACTIVE PROTEIN: CPT

## 2019-01-01 PROCEDURE — 96372 THER/PROPH/DIAG INJ SC/IM: CPT

## 2019-01-01 PROCEDURE — 3014F SCREEN MAMMO DOC REV: CPT | Performed by: INTERNAL MEDICINE

## 2019-01-01 PROCEDURE — G8420 CALC BMI NORM PARAMETERS: HCPCS | Performed by: FAMILY MEDICINE

## 2019-01-01 PROCEDURE — 87040 BLOOD CULTURE FOR BACTERIA: CPT

## 2019-01-01 PROCEDURE — 1123F ACP DISCUSS/DSCN MKR DOCD: CPT | Performed by: INTERNAL MEDICINE

## 2019-01-01 PROCEDURE — 82607 VITAMIN B-12: CPT

## 2019-01-01 PROCEDURE — G8427 DOCREV CUR MEDS BY ELIG CLIN: HCPCS | Performed by: FAMILY MEDICINE

## 2019-01-01 PROCEDURE — 97110 THERAPEUTIC EXERCISES: CPT

## 2019-01-01 PROCEDURE — 1123F ACP DISCUSS/DSCN MKR DOCD: CPT | Performed by: FAMILY MEDICINE

## 2019-01-01 PROCEDURE — 4040F PNEUMOC VAC/ADMIN/RCVD: CPT | Performed by: INTERNAL MEDICINE

## 2019-01-01 PROCEDURE — 85651 RBC SED RATE NONAUTOMATED: CPT

## 2019-01-01 PROCEDURE — 92526 ORAL FUNCTION THERAPY: CPT | Performed by: SPEECH-LANGUAGE PATHOLOGIST

## 2019-01-01 PROCEDURE — 2022F DILAT RTA XM EVC RTNOPTHY: CPT | Performed by: INTERNAL MEDICINE

## 2019-01-01 PROCEDURE — 51701 INSERT BLADDER CATHETER: CPT

## 2019-01-01 RX ORDER — SODIUM CHLORIDE 0.9 % (FLUSH) 0.9 %
10 SYRINGE (ML) INJECTION PRN
Status: DISCONTINUED | OUTPATIENT
Start: 2019-01-01 | End: 2019-01-01 | Stop reason: HOSPADM

## 2019-01-01 RX ORDER — 0.9 % SODIUM CHLORIDE 0.9 %
1000 INTRAVENOUS SOLUTION INTRAVENOUS ONCE
Status: COMPLETED | OUTPATIENT
Start: 2019-01-01 | End: 2019-01-01

## 2019-01-01 RX ORDER — DEXTROSE AND SODIUM CHLORIDE 5; .45 G/100ML; G/100ML
INJECTION, SOLUTION INTRAVENOUS CONTINUOUS
Status: ACTIVE | OUTPATIENT
Start: 2019-01-01 | End: 2019-01-01

## 2019-01-01 RX ORDER — LISINOPRIL AND HYDROCHLOROTHIAZIDE 12.5; 1 MG/1; MG/1
1 TABLET ORAL
Qty: 30 TABLET | Refills: 5 | Status: SHIPPED | OUTPATIENT
Start: 2019-01-01

## 2019-01-01 RX ORDER — IPRATROPIUM BROMIDE AND ALBUTEROL SULFATE 2.5; .5 MG/3ML; MG/3ML
1 SOLUTION RESPIRATORY (INHALATION) ONCE
Status: COMPLETED | OUTPATIENT
Start: 2019-01-01 | End: 2019-01-01

## 2019-01-01 RX ORDER — DEXTROSE MONOHYDRATE 50 MG/ML
100 INJECTION, SOLUTION INTRAVENOUS PRN
Status: DISCONTINUED | OUTPATIENT
Start: 2019-01-01 | End: 2019-01-01 | Stop reason: HOSPADM

## 2019-01-01 RX ORDER — METFORMIN HYDROCHLORIDE 500 MG/1
1000 TABLET, EXTENDED RELEASE ORAL 2 TIMES DAILY
Status: DISCONTINUED | OUTPATIENT
Start: 2019-01-01 | End: 2019-01-01 | Stop reason: HOSPADM

## 2019-01-01 RX ORDER — SODIUM CHLORIDE 9 MG/ML
INJECTION, SOLUTION INTRAVENOUS CONTINUOUS
Status: DISCONTINUED | OUTPATIENT
Start: 2019-01-01 | End: 2019-01-01 | Stop reason: HOSPADM

## 2019-01-01 RX ORDER — FLUCONAZOLE 150 MG/1
150 TABLET ORAL ONCE
Status: COMPLETED | OUTPATIENT
Start: 2019-01-01 | End: 2019-01-01

## 2019-01-01 RX ORDER — FLUCONAZOLE 150 MG/1
TABLET ORAL
Qty: 3 TABLET | Refills: 0 | Status: SHIPPED | OUTPATIENT
Start: 2019-01-01

## 2019-01-01 RX ORDER — FLUTICASONE PROPIONATE 50 MCG
1 SPRAY, SUSPENSION (ML) NASAL DAILY
Status: DISCONTINUED | OUTPATIENT
Start: 2019-01-01 | End: 2019-01-01 | Stop reason: HOSPADM

## 2019-01-01 RX ORDER — NICOTINE POLACRILEX 4 MG
15 LOZENGE BUCCAL PRN
Status: DISCONTINUED | OUTPATIENT
Start: 2019-01-01 | End: 2019-01-01 | Stop reason: HOSPADM

## 2019-01-01 RX ORDER — ONDANSETRON 2 MG/ML
4 INJECTION INTRAMUSCULAR; INTRAVENOUS EVERY 6 HOURS PRN
Status: DISCONTINUED | OUTPATIENT
Start: 2019-01-01 | End: 2019-01-01 | Stop reason: HOSPADM

## 2019-01-01 RX ORDER — FLUTICASONE PROPIONATE 50 MCG
1 SPRAY, SUSPENSION (ML) NASAL DAILY PRN
Status: DISCONTINUED | OUTPATIENT
Start: 2019-01-01 | End: 2019-01-01 | Stop reason: HOSPADM

## 2019-01-01 RX ORDER — GLIPIZIDE 5 MG/1
10 TABLET ORAL DAILY
Status: DISCONTINUED | OUTPATIENT
Start: 2019-01-01 | End: 2019-01-01 | Stop reason: HOSPADM

## 2019-01-01 RX ORDER — GLIPIZIDE 10 MG/1
10 TABLET, FILM COATED, EXTENDED RELEASE ORAL DAILY
COMMUNITY

## 2019-01-01 RX ORDER — LISINOPRIL 10 MG/1
10 TABLET ORAL DAILY
Status: DISCONTINUED | OUTPATIENT
Start: 2019-01-01 | End: 2019-01-01 | Stop reason: HOSPADM

## 2019-01-01 RX ORDER — LORATADINE 10 MG/1
10 TABLET ORAL DAILY
Status: DISCONTINUED | OUTPATIENT
Start: 2019-01-01 | End: 2019-01-01 | Stop reason: HOSPADM

## 2019-01-01 RX ORDER — SODIUM CHLORIDE 0.9 % (FLUSH) 0.9 %
SYRINGE (ML) INJECTION
Status: DISCONTINUED
Start: 2019-01-01 | End: 2019-01-01 | Stop reason: HOSPADM

## 2019-01-01 RX ORDER — LORATADINE 10 MG/1
10 CAPSULE, LIQUID FILLED ORAL DAILY
COMMUNITY
End: 2019-01-01 | Stop reason: ALTCHOICE

## 2019-01-01 RX ORDER — ASPIRIN 81 MG/1
81 TABLET ORAL EVERY MORNING
Status: DISCONTINUED | OUTPATIENT
Start: 2019-01-01 | End: 2019-01-01 | Stop reason: HOSPADM

## 2019-01-01 RX ORDER — CEFDINIR 300 MG/1
300 CAPSULE ORAL EVERY 12 HOURS SCHEDULED
Status: DISCONTINUED | OUTPATIENT
Start: 2019-01-01 | End: 2019-01-01 | Stop reason: HOSPADM

## 2019-01-01 RX ORDER — CEPHALEXIN 500 MG/1
500 CAPSULE ORAL 3 TIMES DAILY
COMMUNITY
End: 2019-01-01

## 2019-01-01 RX ORDER — LORATADINE 10 MG/1
10 TABLET ORAL DAILY
COMMUNITY

## 2019-01-01 RX ORDER — CEFDINIR 300 MG/1
300 CAPSULE ORAL 2 TIMES DAILY
Qty: 20 CAPSULE | Refills: 0 | Status: SHIPPED | OUTPATIENT
Start: 2019-01-01 | End: 2019-01-01

## 2019-01-01 RX ORDER — CETIRIZINE HYDROCHLORIDE 10 MG/1
10 TABLET ORAL DAILY
Status: DISCONTINUED | OUTPATIENT
Start: 2019-01-01 | End: 2019-01-01 | Stop reason: HOSPADM

## 2019-01-01 RX ORDER — METFORMIN HYDROCHLORIDE 500 MG/1
1000 TABLET, EXTENDED RELEASE ORAL 2 TIMES DAILY
Qty: 120 TABLET | Refills: 5 | Status: SHIPPED | OUTPATIENT
Start: 2019-01-01 | End: 2019-01-01 | Stop reason: SDUPTHER

## 2019-01-01 RX ORDER — FLUTICASONE PROPIONATE 50 MCG
1 SPRAY, SUSPENSION (ML) NASAL DAILY PRN
COMMUNITY

## 2019-01-01 RX ORDER — 0.9 % SODIUM CHLORIDE 0.9 %
500 INTRAVENOUS SOLUTION INTRAVENOUS ONCE
Status: DISCONTINUED | OUTPATIENT
Start: 2019-01-01 | End: 2019-01-01 | Stop reason: HOSPADM

## 2019-01-01 RX ORDER — ATORVASTATIN CALCIUM 20 MG/1
20 TABLET, FILM COATED ORAL
Status: DISCONTINUED | OUTPATIENT
Start: 2019-01-01 | End: 2019-01-01 | Stop reason: HOSPADM

## 2019-01-01 RX ORDER — CEPHALEXIN 500 MG/1
500 CAPSULE ORAL 3 TIMES DAILY
Qty: 21 CAPSULE | Refills: 0 | Status: SHIPPED | OUTPATIENT
Start: 2019-01-01 | End: 2019-01-01

## 2019-01-01 RX ORDER — DEXTROSE MONOHYDRATE 25 G/50ML
25 INJECTION, SOLUTION INTRAVENOUS ONCE
Status: DISCONTINUED | OUTPATIENT
Start: 2019-01-01 | End: 2019-01-01

## 2019-01-01 RX ORDER — CEFDINIR 300 MG/1
300 CAPSULE ORAL 2 TIMES DAILY
Qty: 10 CAPSULE | Refills: 0 | DISCHARGE
Start: 2019-01-01 | End: 2019-01-01

## 2019-01-01 RX ORDER — LANOLIN ALCOHOL/MO/W.PET/CERES
1000 CREAM (GRAM) TOPICAL EVERY MORNING
Status: DISCONTINUED | OUTPATIENT
Start: 2019-01-01 | End: 2019-01-01 | Stop reason: HOSPADM

## 2019-01-01 RX ORDER — DEXTROSE MONOHYDRATE 25 G/50ML
12.5 INJECTION, SOLUTION INTRAVENOUS PRN
Status: DISCONTINUED | OUTPATIENT
Start: 2019-01-01 | End: 2019-01-01 | Stop reason: HOSPADM

## 2019-01-01 RX ORDER — ASPIRIN 81 MG/1
243 TABLET, CHEWABLE ORAL ONCE
Status: COMPLETED | OUTPATIENT
Start: 2019-01-01 | End: 2019-01-01

## 2019-01-01 RX ORDER — SODIUM CHLORIDE 0.9 % (FLUSH) 0.9 %
10 SYRINGE (ML) INJECTION EVERY 12 HOURS SCHEDULED
Status: DISCONTINUED | OUTPATIENT
Start: 2019-01-01 | End: 2019-01-01 | Stop reason: HOSPADM

## 2019-01-01 RX ORDER — HYDROCHLOROTHIAZIDE 12.5 MG/1
12.5 TABLET ORAL DAILY
Status: DISCONTINUED | OUTPATIENT
Start: 2019-01-01 | End: 2019-01-01 | Stop reason: HOSPADM

## 2019-01-01 RX ORDER — LISINOPRIL AND HYDROCHLOROTHIAZIDE 12.5; 1 MG/1; MG/1
1 TABLET ORAL
Status: DISCONTINUED | OUTPATIENT
Start: 2019-01-01 | End: 2019-01-01 | Stop reason: CLARIF

## 2019-01-01 RX ORDER — ATORVASTATIN CALCIUM 20 MG/1
20 TABLET, FILM COATED ORAL
Qty: 30 TABLET | Refills: 5 | Status: SHIPPED | OUTPATIENT
Start: 2019-01-01

## 2019-01-01 RX ORDER — HYDROCHLOROTHIAZIDE 12.5 MG/1
12.5 TABLET ORAL
Status: DISCONTINUED | OUTPATIENT
Start: 2019-01-01 | End: 2019-01-01 | Stop reason: HOSPADM

## 2019-01-01 RX ORDER — DONEPEZIL HYDROCHLORIDE 10 MG/1
10 TABLET, FILM COATED ORAL NIGHTLY
COMMUNITY

## 2019-01-01 RX ORDER — ACETAMINOPHEN 325 MG/1
650 TABLET ORAL EVERY 4 HOURS PRN
Status: DISCONTINUED | OUTPATIENT
Start: 2019-01-01 | End: 2019-01-01 | Stop reason: HOSPADM

## 2019-01-01 RX ORDER — M-VIT,TX,IRON,MINS/CALC/FOLIC 27MG-0.4MG
1 TABLET ORAL
Status: DISCONTINUED | OUTPATIENT
Start: 2019-01-01 | End: 2019-01-01 | Stop reason: HOSPADM

## 2019-01-01 RX ORDER — DONEPEZIL HYDROCHLORIDE 5 MG/1
10 TABLET, FILM COATED ORAL EVERY MORNING
Status: DISCONTINUED | OUTPATIENT
Start: 2019-01-01 | End: 2019-01-01 | Stop reason: HOSPADM

## 2019-01-01 RX ORDER — SODIUM CHLORIDE 0.9 % (FLUSH) 0.9 %
10 SYRINGE (ML) INJECTION PRN
Status: COMPLETED | OUTPATIENT
Start: 2019-01-01 | End: 2019-01-01

## 2019-01-01 RX ORDER — CYANOCOBALAMIN (VITAMIN B-12) 5000 MCG
TABLET,DISINTEGRATING ORAL 2 TIMES DAILY
Status: ON HOLD | COMMUNITY
End: 2019-01-01

## 2019-01-01 RX ORDER — GLIPIZIDE 10 MG/1
10 TABLET, FILM COATED, EXTENDED RELEASE ORAL 2 TIMES DAILY WITH MEALS
Qty: 60 TABLET | Refills: 1 | Status: ON HOLD | OUTPATIENT
Start: 2019-01-01 | End: 2019-01-01 | Stop reason: HOSPADM

## 2019-01-01 RX ORDER — CEFDINIR 300 MG/1
300 CAPSULE ORAL EVERY 12 HOURS SCHEDULED
Qty: 20 CAPSULE | Refills: 0 | Status: SHIPPED | OUTPATIENT
Start: 2019-01-01 | End: 2019-01-01

## 2019-01-01 RX ORDER — KETOROLAC TROMETHAMINE 30 MG/ML
15 INJECTION, SOLUTION INTRAMUSCULAR; INTRAVENOUS ONCE
Status: COMPLETED | OUTPATIENT
Start: 2019-01-01 | End: 2019-01-01

## 2019-01-01 RX ORDER — GLIPIZIDE 2.5 MG/1
10 TABLET, EXTENDED RELEASE ORAL DAILY
Status: DISCONTINUED | OUTPATIENT
Start: 2019-01-01 | End: 2019-01-01 | Stop reason: HOSPADM

## 2019-01-01 RX ORDER — SITAGLIPTIN 100 MG/1
TABLET, FILM COATED ORAL
Qty: 30 TABLET | Refills: 5 | Status: SHIPPED | OUTPATIENT
Start: 2019-01-01 | End: 2019-01-01 | Stop reason: ALTCHOICE

## 2019-01-01 RX ORDER — GLIPIZIDE 10 MG/1
10 TABLET, FILM COATED, EXTENDED RELEASE ORAL DAILY
COMMUNITY
End: 2019-01-01

## 2019-01-01 RX ORDER — DEXTROSE MONOHYDRATE 25 G/50ML
25 INJECTION, SOLUTION INTRAVENOUS ONCE
Status: COMPLETED | OUTPATIENT
Start: 2019-01-01 | End: 2019-01-01

## 2019-01-01 RX ORDER — DONEPEZIL HYDROCHLORIDE 10 MG/1
10 TABLET, FILM COATED ORAL EVERY MORNING
Status: DISCONTINUED | OUTPATIENT
Start: 2019-01-01 | End: 2019-01-01 | Stop reason: HOSPADM

## 2019-01-01 RX ORDER — CIPROFLOXACIN 500 MG/1
500 TABLET, FILM COATED ORAL 2 TIMES DAILY
Qty: 14 TABLET | Refills: 1 | Status: SHIPPED | OUTPATIENT
Start: 2019-01-01 | End: 2019-01-01

## 2019-01-01 RX ORDER — POTASSIUM BICARBONATE 25 MEQ/1
50 TABLET, EFFERVESCENT ORAL 2 TIMES DAILY
Status: DISCONTINUED | OUTPATIENT
Start: 2019-01-01 | End: 2019-01-01 | Stop reason: CLARIF

## 2019-01-01 RX ORDER — DONEPEZIL HYDROCHLORIDE 5 MG/1
10 TABLET, FILM COATED ORAL NIGHTLY
Status: DISCONTINUED | OUTPATIENT
Start: 2019-01-01 | End: 2019-01-01 | Stop reason: HOSPADM

## 2019-01-01 RX ORDER — MAGNESIUM SULFATE IN WATER 40 MG/ML
4 INJECTION, SOLUTION INTRAVENOUS ONCE
Status: COMPLETED | OUTPATIENT
Start: 2019-01-01 | End: 2019-01-01

## 2019-01-01 RX ORDER — DONEPEZIL HYDROCHLORIDE 5 MG/1
10 TABLET, ORALLY DISINTEGRATING ORAL EVERY MORNING
Status: ON HOLD | COMMUNITY
End: 2019-01-01

## 2019-01-01 RX ORDER — ASPIRIN 81 MG/1
81 TABLET, CHEWABLE ORAL EVERY MORNING
Status: DISCONTINUED | OUTPATIENT
Start: 2019-01-01 | End: 2019-01-01 | Stop reason: HOSPADM

## 2019-01-01 RX ORDER — METFORMIN HYDROCHLORIDE 500 MG/1
1000 TABLET, EXTENDED RELEASE ORAL 2 TIMES DAILY
Qty: 360 TABLET | Refills: 5 | Status: SHIPPED | OUTPATIENT
Start: 2019-01-01

## 2019-01-01 RX ORDER — FENTANYL CITRATE 50 UG/ML
75 INJECTION, SOLUTION INTRAMUSCULAR; INTRAVENOUS ONCE
Status: DISCONTINUED | OUTPATIENT
Start: 2019-01-01 | End: 2019-01-01 | Stop reason: HOSPADM

## 2019-01-01 RX ORDER — INSULIN GLARGINE 100 [IU]/ML
8 INJECTION, SOLUTION SUBCUTANEOUS NIGHTLY
Status: DISCONTINUED | OUTPATIENT
Start: 2019-01-01 | End: 2019-01-01

## 2019-01-01 RX ORDER — LISINOPRIL 10 MG/1
10 TABLET ORAL
Status: DISCONTINUED | OUTPATIENT
Start: 2019-01-01 | End: 2019-01-01 | Stop reason: HOSPADM

## 2019-01-01 RX ADMIN — Medication 1000 MCG: at 09:48

## 2019-01-01 RX ADMIN — CARBIDOPA AND LEVODOPA 1 TABLET: 25; 100 TABLET ORAL at 23:05

## 2019-01-01 RX ADMIN — METFORMIN HYDROCHLORIDE 1000 MG: 500 TABLET, EXTENDED RELEASE ORAL at 23:15

## 2019-01-01 RX ADMIN — ASPIRIN 81 MG 243 MG: 81 TABLET ORAL at 09:20

## 2019-01-01 RX ADMIN — BARIUM SULFATE 70 G: 0.81 POWDER, FOR SUSPENSION ORAL at 09:54

## 2019-01-01 RX ADMIN — ASPIRIN 81 MG 81 MG: 81 TABLET ORAL at 09:53

## 2019-01-01 RX ADMIN — INSULIN LISPRO 1 UNITS: 100 INJECTION, SOLUTION INTRAVENOUS; SUBCUTANEOUS at 21:48

## 2019-01-01 RX ADMIN — DEXTROSE MONOHYDRATE 25 G: 25 INJECTION, SOLUTION INTRAVENOUS at 06:41

## 2019-01-01 RX ADMIN — Medication 10 ML: at 09:48

## 2019-01-01 RX ADMIN — METFORMIN HYDROCHLORIDE 1000 MG: 500 TABLET, EXTENDED RELEASE ORAL at 10:30

## 2019-01-01 RX ADMIN — CARBIDOPA AND LEVODOPA 1 TABLET: 25; 100 TABLET ORAL at 08:19

## 2019-01-01 RX ADMIN — DEXTROSE AND SODIUM CHLORIDE: 5; 450 INJECTION, SOLUTION INTRAVENOUS at 10:28

## 2019-01-01 RX ADMIN — ATORVASTATIN CALCIUM 20 MG: 20 TABLET, FILM COATED ORAL at 18:38

## 2019-01-01 RX ADMIN — METFORMIN HYDROCHLORIDE 1000 MG: 500 TABLET, EXTENDED RELEASE ORAL at 09:54

## 2019-01-01 RX ADMIN — DONEPEZIL HYDROCHLORIDE 10 MG: 5 TABLET, FILM COATED ORAL at 00:39

## 2019-01-01 RX ADMIN — SODIUM CHLORIDE 1000 ML: 9 INJECTION, SOLUTION INTRAVENOUS at 18:21

## 2019-01-01 RX ADMIN — LISINOPRIL 10 MG: 10 TABLET ORAL at 10:31

## 2019-01-01 RX ADMIN — CEFTRIAXONE 1 G: 1 INJECTION, POWDER, FOR SOLUTION INTRAMUSCULAR; INTRAVENOUS at 10:22

## 2019-01-01 RX ADMIN — ASPIRIN 81 MG: 81 TABLET, COATED ORAL at 09:11

## 2019-01-01 RX ADMIN — Medication 1 TABLET: at 09:11

## 2019-01-01 RX ADMIN — ENOXAPARIN SODIUM 40 MG: 40 INJECTION SUBCUTANEOUS at 13:56

## 2019-01-01 RX ADMIN — Medication 1 TABLET: at 08:28

## 2019-01-01 RX ADMIN — SODIUM CHLORIDE: 9 INJECTION, SOLUTION INTRAVENOUS at 16:52

## 2019-01-01 RX ADMIN — CEFTRIAXONE 1 G: 1 INJECTION, POWDER, FOR SOLUTION INTRAMUSCULAR; INTRAVENOUS at 23:20

## 2019-01-01 RX ADMIN — POTASSIUM BICARBONATE 40 MEQ: 782 TABLET, EFFERVESCENT ORAL at 21:49

## 2019-01-01 RX ADMIN — Medication 10 ML: at 10:00

## 2019-01-01 RX ADMIN — CARBIDOPA AND LEVODOPA 1 TABLET: 25; 100 TABLET ORAL at 09:49

## 2019-01-01 RX ADMIN — CARBIDOPA AND LEVODOPA 1 TABLET: 25; 100 TABLET ORAL at 13:30

## 2019-01-01 RX ADMIN — LORATADINE 10 MG: 10 TABLET ORAL at 10:33

## 2019-01-01 RX ADMIN — Medication 1 TABLET: at 08:19

## 2019-01-01 RX ADMIN — METFORMIN HYDROCHLORIDE 1000 MG: 500 TABLET, EXTENDED RELEASE ORAL at 17:19

## 2019-01-01 RX ADMIN — FLUCONAZOLE 150 MG: 150 TABLET ORAL at 19:53

## 2019-01-01 RX ADMIN — CARBIDOPA AND LEVODOPA 1 TABLET: 25; 100 TABLET ORAL at 21:52

## 2019-01-01 RX ADMIN — Medication 1000 MCG: at 10:29

## 2019-01-01 RX ADMIN — LISINOPRIL 10 MG: 10 TABLET ORAL at 08:27

## 2019-01-01 RX ADMIN — INSULIN LISPRO 2 UNITS: 100 INJECTION, SOLUTION INTRAVENOUS; SUBCUTANEOUS at 11:27

## 2019-01-01 RX ADMIN — INSULIN LISPRO 4 UNITS: 100 INJECTION, SOLUTION INTRAVENOUS; SUBCUTANEOUS at 12:00

## 2019-01-01 RX ADMIN — CETIRIZINE HYDROCHLORIDE 10 MG: 10 TABLET, FILM COATED ORAL at 13:55

## 2019-01-01 RX ADMIN — Medication 1000 MCG: at 08:19

## 2019-01-01 RX ADMIN — GLIPIZIDE 10 MG: 2.5 TABLET, FILM COATED, EXTENDED RELEASE ORAL at 08:19

## 2019-01-01 RX ADMIN — CARBIDOPA AND LEVODOPA 1 TABLET: 25; 100 TABLET ORAL at 09:11

## 2019-01-01 RX ADMIN — Medication 10 ML: at 23:05

## 2019-01-01 RX ADMIN — IPRATROPIUM BROMIDE AND ALBUTEROL SULFATE 1 AMPULE: .5; 3 SOLUTION RESPIRATORY (INHALATION) at 00:55

## 2019-01-01 RX ADMIN — METFORMIN HYDROCHLORIDE 1000 MG: 500 TABLET, EXTENDED RELEASE ORAL at 16:46

## 2019-01-01 RX ADMIN — INSULIN GLARGINE 8 UNITS: 100 INJECTION, SOLUTION SUBCUTANEOUS at 21:54

## 2019-01-01 RX ADMIN — ENOXAPARIN SODIUM 40 MG: 40 INJECTION SUBCUTANEOUS at 23:04

## 2019-01-01 RX ADMIN — SODIUM CHLORIDE 1000 ML: 9 INJECTION, SOLUTION INTRAVENOUS at 22:34

## 2019-01-01 RX ADMIN — CEFTRIAXONE 1 G: 1 INJECTION, POWDER, FOR SOLUTION INTRAMUSCULAR; INTRAVENOUS at 20:04

## 2019-01-01 RX ADMIN — HYDROCHLOROTHIAZIDE 12.5 MG: 12.5 TABLET ORAL at 10:29

## 2019-01-01 RX ADMIN — ENOXAPARIN SODIUM 40 MG: 40 INJECTION SUBCUTANEOUS at 10:30

## 2019-01-01 RX ADMIN — CEFDINIR 300 MG: 300 CAPSULE ORAL at 11:27

## 2019-01-01 RX ADMIN — Medication 10 ML: at 08:17

## 2019-01-01 RX ADMIN — IOPAMIDOL 110 ML: 755 INJECTION, SOLUTION INTRAVENOUS at 08:17

## 2019-01-01 RX ADMIN — CARBIDOPA AND LEVODOPA 1 TABLET: 25; 100 TABLET ORAL at 01:09

## 2019-01-01 RX ADMIN — HYDROCHLOROTHIAZIDE 12.5 MG: 12.5 TABLET ORAL at 09:11

## 2019-01-01 RX ADMIN — BARIUM SULFATE 120 ML: 400 SUSPENSION ORAL at 09:54

## 2019-01-01 RX ADMIN — ENOXAPARIN SODIUM 40 MG: 40 INJECTION SUBCUTANEOUS at 09:48

## 2019-01-01 RX ADMIN — CARBIDOPA AND LEVODOPA 1 TABLET: 25; 100 TABLET ORAL at 09:54

## 2019-01-01 RX ADMIN — POTASSIUM BICARBONATE 40 MEQ: 782 TABLET, EFFERVESCENT ORAL at 08:19

## 2019-01-01 RX ADMIN — HYDROCHLOROTHIAZIDE 12.5 MG: 12.5 TABLET ORAL at 09:59

## 2019-01-01 RX ADMIN — KETOROLAC TROMETHAMINE 15 MG: 30 INJECTION INTRAMUSCULAR; INTRAVENOUS at 12:25

## 2019-01-01 RX ADMIN — ASPIRIN 81 MG: 81 TABLET, COATED ORAL at 10:29

## 2019-01-01 RX ADMIN — METFORMIN HYDROCHLORIDE 1000 MG: 500 TABLET, EXTENDED RELEASE ORAL at 09:11

## 2019-01-01 RX ADMIN — Medication 1 TABLET: at 10:29

## 2019-01-01 RX ADMIN — LISINOPRIL 10 MG: 10 TABLET ORAL at 09:53

## 2019-01-01 RX ADMIN — CEFTRIAXONE 1 G: 1 INJECTION, POWDER, FOR SOLUTION INTRAMUSCULAR; INTRAVENOUS at 22:51

## 2019-01-01 RX ADMIN — CEFTRIAXONE 1 G: 1 INJECTION, POWDER, FOR SOLUTION INTRAMUSCULAR; INTRAVENOUS at 23:14

## 2019-01-01 RX ADMIN — ATORVASTATIN CALCIUM 20 MG: 20 TABLET, FILM COATED ORAL at 16:45

## 2019-01-01 RX ADMIN — CARBIDOPA AND LEVODOPA 1 TABLET: 25; 100 TABLET ORAL at 21:49

## 2019-01-01 RX ADMIN — ENOXAPARIN SODIUM 40 MG: 40 INJECTION SUBCUTANEOUS at 10:00

## 2019-01-01 RX ADMIN — SODIUM CHLORIDE 1000 ML: 9 INJECTION, SOLUTION INTRAVENOUS at 21:52

## 2019-01-01 RX ADMIN — Medication 10 ML: at 12:25

## 2019-01-01 RX ADMIN — LISINOPRIL 10 MG: 10 TABLET ORAL at 08:19

## 2019-01-01 RX ADMIN — INSULIN LISPRO 2 UNITS: 100 INJECTION, SOLUTION INTRAVENOUS; SUBCUTANEOUS at 16:47

## 2019-01-01 RX ADMIN — METFORMIN HYDROCHLORIDE 1000 MG: 500 TABLET, EXTENDED RELEASE ORAL at 08:19

## 2019-01-01 RX ADMIN — ASPIRIN 81 MG 81 MG: 81 TABLET ORAL at 09:48

## 2019-01-01 RX ADMIN — CARBIDOPA AND LEVODOPA 1 TABLET: 25; 100 TABLET ORAL at 13:55

## 2019-01-01 RX ADMIN — LISINOPRIL 10 MG: 10 TABLET ORAL at 09:11

## 2019-01-01 RX ADMIN — ATORVASTATIN CALCIUM 20 MG: 20 TABLET, FILM COATED ORAL at 00:39

## 2019-01-01 RX ADMIN — ATORVASTATIN CALCIUM 20 MG: 20 TABLET, FILM COATED ORAL at 23:05

## 2019-01-01 RX ADMIN — Medication 1000 MCG: at 09:11

## 2019-01-01 RX ADMIN — DONEPEZIL HYDROCHLORIDE 10 MG: 5 TABLET, FILM COATED ORAL at 21:49

## 2019-01-01 RX ADMIN — POTASSIUM BICARBONATE 40 MEQ: 782 TABLET, EFFERVESCENT ORAL at 09:11

## 2019-01-01 RX ADMIN — CARBIDOPA AND LEVODOPA 1 TABLET: 25; 100 TABLET ORAL at 10:29

## 2019-01-01 RX ADMIN — INSULIN LISPRO 4 UNITS: 100 INJECTION, SOLUTION INTRAVENOUS; SUBCUTANEOUS at 19:30

## 2019-01-01 RX ADMIN — DONEPEZIL HYDROCHLORIDE 10 MG: 5 TABLET, FILM COATED ORAL at 09:49

## 2019-01-01 RX ADMIN — MAGNESIUM SULFATE HEPTAHYDRATE 4 G: 40 INJECTION, SOLUTION INTRAVENOUS at 06:19

## 2019-01-01 RX ADMIN — SODIUM CHLORIDE: 9 INJECTION, SOLUTION INTRAVENOUS at 00:38

## 2019-01-01 RX ADMIN — ASPIRIN 81 MG: 81 TABLET, COATED ORAL at 08:19

## 2019-01-01 RX ADMIN — DONEPEZIL HYDROCHLORIDE 10 MG: 5 TABLET, FILM COATED ORAL at 13:55

## 2019-01-01 RX ADMIN — ATORVASTATIN CALCIUM 20 MG: 20 TABLET, FILM COATED ORAL at 17:19

## 2019-01-01 RX ADMIN — Medication 1000 MCG: at 13:55

## 2019-01-01 RX ADMIN — INSULIN LISPRO 2 UNITS: 100 INJECTION, SOLUTION INTRAVENOUS; SUBCUTANEOUS at 09:56

## 2019-01-01 RX ADMIN — DONEPEZIL HYDROCHLORIDE 10 MG: 5 TABLET, FILM COATED ORAL at 21:52

## 2019-01-01 RX ADMIN — LORATADINE 10 MG: 10 TABLET ORAL at 08:19

## 2019-01-01 RX ADMIN — CARBIDOPA AND LEVODOPA 1 TABLET: 25; 100 TABLET ORAL at 13:49

## 2019-01-01 RX ADMIN — ASPIRIN 81 MG 81 MG: 81 TABLET ORAL at 13:55

## 2019-01-01 RX ADMIN — ENOXAPARIN SODIUM 40 MG: 40 INJECTION SUBCUTANEOUS at 09:11

## 2019-01-01 RX ADMIN — CETIRIZINE HYDROCHLORIDE 10 MG: 10 TABLET, FILM COATED ORAL at 09:48

## 2019-01-01 RX ADMIN — Medication 10 ML: at 12:26

## 2019-01-01 RX ADMIN — SODIUM CHLORIDE, PRESERVATIVE FREE 10 ML: 5 INJECTION INTRAVENOUS at 21:53

## 2019-01-01 RX ADMIN — CEFTRIAXONE 1 G: 1 INJECTION, POWDER, FOR SOLUTION INTRAMUSCULAR; INTRAVENOUS at 00:09

## 2019-01-01 RX ADMIN — INSULIN HUMAN 10 UNITS: 100 INJECTION, SOLUTION PARENTERAL at 10:00

## 2019-01-01 RX ADMIN — ATORVASTATIN CALCIUM 20 MG: 20 TABLET, FILM COATED ORAL at 17:17

## 2019-01-01 RX ADMIN — INSULIN LISPRO 2 UNITS: 100 INJECTION, SOLUTION INTRAVENOUS; SUBCUTANEOUS at 12:25

## 2019-01-01 RX ADMIN — HYDROCHLOROTHIAZIDE 12.5 MG: 12.5 TABLET ORAL at 08:27

## 2019-01-01 RX ADMIN — ENOXAPARIN SODIUM 40 MG: 40 INJECTION SUBCUTANEOUS at 08:19

## 2019-01-01 RX ADMIN — HYDROCHLOROTHIAZIDE 12.5 MG: 12.5 TABLET ORAL at 08:19

## 2019-01-01 ASSESSMENT — ENCOUNTER SYMPTOMS
SINUS PAIN: 0
SHORTNESS OF BREATH: 0
ABDOMINAL DISTENTION: 0
CHEST TIGHTNESS: 0
HEMATEMESIS: 0
BACK PAIN: 0
VOMITING: 0
SORE THROAT: 0
DIARRHEA: 0
DIARRHEA: 0
VOMITING: 0
HEMATOCHEZIA: 0
DIARRHEA: 0
SHORTNESS OF BREATH: 0
NAUSEA: 0
NAUSEA: 0
EYE PAIN: 0
NAUSEA: 0
ABDOMINAL PAIN: 1
BLOOD IN STOOL: 0
EYE DISCHARGE: 0
BACK PAIN: 0
DIARRHEA: 0
SHORTNESS OF BREATH: 0
SORE THROAT: 0
SHORTNESS OF BREATH: 0
RHINORRHEA: 0
SHORTNESS OF BREATH: 1
SINUS PAIN: 0
VOMITING: 0
WHEEZING: 0
DIARRHEA: 0
BACK PAIN: 0
ABDOMINAL PAIN: 0
COUGH: 0
BACK PAIN: 0
EYE REDNESS: 0
NAUSEA: 0
SINUS PAIN: 0
COLOR CHANGE: 0
COUGH: 0
SORE THROAT: 0
SORE THROAT: 0
EYE PAIN: 0
CONSTIPATION: 0
COUGH: 0
CHEST TIGHTNESS: 0
VOMITING: 0
BLOOD IN STOOL: 0
ABDOMINAL PAIN: 1
EYE PAIN: 0
BACK PAIN: 0
CONSTIPATION: 0
SHORTNESS OF BREATH: 0
SINUS PRESSURE: 0
ABDOMINAL PAIN: 1
DIARRHEA: 0
CONSTIPATION: 0
ABDOMINAL PAIN: 0
COUGH: 0
CHEST TIGHTNESS: 0
ABDOMINAL PAIN: 0
BLOOD IN STOOL: 0
COLOR CHANGE: 0
BACK PAIN: 0

## 2019-01-01 ASSESSMENT — PAIN DESCRIPTION - PROGRESSION
CLINICAL_PROGRESSION: GRADUALLY WORSENING
CLINICAL_PROGRESSION: GRADUALLY WORSENING

## 2019-01-01 ASSESSMENT — PAIN DESCRIPTION - ORIENTATION
ORIENTATION: MID;LOWER
ORIENTATION: RIGHT

## 2019-01-01 ASSESSMENT — PAIN SCALES - GENERAL
PAINLEVEL_OUTOF10: 0
PAINLEVEL_OUTOF10: 6
PAINLEVEL_OUTOF10: 0
PAINLEVEL_OUTOF10: 0
PAINLEVEL_OUTOF10: 5
PAINLEVEL_OUTOF10: 0
PAINLEVEL_OUTOF10: 3
PAINLEVEL_OUTOF10: 0

## 2019-01-01 ASSESSMENT — PATIENT HEALTH QUESTIONNAIRE - PHQ9
1. LITTLE INTEREST OR PLEASURE IN DOING THINGS: 0
SUM OF ALL RESPONSES TO PHQ QUESTIONS 1-9: 0
SUM OF ALL RESPONSES TO PHQ9 QUESTIONS 1 & 2: 0
2. FEELING DOWN, DEPRESSED OR HOPELESS: 0
SUM OF ALL RESPONSES TO PHQ QUESTIONS 1-9: 0

## 2019-01-01 ASSESSMENT — PAIN DESCRIPTION - PAIN TYPE
TYPE: ACUTE PAIN
TYPE: ACUTE PAIN

## 2019-01-01 ASSESSMENT — PAIN DESCRIPTION - LOCATION
LOCATION: GROIN
LOCATION: ABDOMEN

## 2019-01-01 ASSESSMENT — PAIN SCALES - WONG BAKER: WONGBAKER_NUMERICALRESPONSE: 2

## 2019-01-01 ASSESSMENT — PAIN DESCRIPTION - FREQUENCY
FREQUENCY: CONTINUOUS
FREQUENCY: INTERMITTENT

## 2019-01-01 ASSESSMENT — PAIN DESCRIPTION - ONSET
ONSET: SUDDEN
ONSET: GRADUAL

## 2019-01-01 ASSESSMENT — PAIN DESCRIPTION - DESCRIPTORS
DESCRIPTORS: ACHING;CONSTANT;DISCOMFORT
DESCRIPTORS: ACHING

## 2019-01-28 ENCOUNTER — HOSPITAL ENCOUNTER (EMERGENCY)
Age: 74
Discharge: HOME | End: 2019-01-28
Payer: MEDICARE

## 2019-01-28 VITALS
HEIGHT: 65 IN | BODY MASS INDEX: 23.66 KG/M2 | HEART RATE: 82 BPM | RESPIRATION RATE: 16 BRPM | WEIGHT: 142 LBS | DIASTOLIC BLOOD PRESSURE: 62 MMHG | TEMPERATURE: 98.6 F | OXYGEN SATURATION: 98 % | SYSTOLIC BLOOD PRESSURE: 135 MMHG

## 2019-01-28 DIAGNOSIS — E16.2 HYPOGLYCEMIA: Primary | ICD-10-CM

## 2019-01-28 DIAGNOSIS — E11.649 TYPE 2 DIABETES MELLITUS WITH HYPOGLYCEMIA WITHOUT COMA, WITHOUT LONG-TERM CURRENT USE OF INSULIN (HCC): ICD-10-CM

## 2019-01-28 LAB
ANION GAP SERPL CALCULATED.3IONS-SCNC: 17 MMOL/L (ref 7–16)
BASOPHILS ABSOLUTE: 0.04 E9/L (ref 0–0.2)
BASOPHILS RELATIVE PERCENT: 0.3 % (ref 0–2)
BUN BLDV-MCNC: 23 MG/DL (ref 8–23)
CALCIUM SERPL-MCNC: 9.9 MG/DL (ref 8.6–10.2)
CHLORIDE BLD-SCNC: 100 MMOL/L (ref 98–107)
CO2: 23 MMOL/L (ref 22–29)
CREAT SERPL-MCNC: 1 MG/DL (ref 0.5–1)
EOSINOPHILS ABSOLUTE: 0.17 E9/L (ref 0.05–0.5)
EOSINOPHILS RELATIVE PERCENT: 1.4 % (ref 0–6)
GFR AFRICAN AMERICAN: >60
GFR NON-AFRICAN AMERICAN: 54 ML/MIN/1.73
GLUCOSE BLD-MCNC: 56 MG/DL (ref 74–99)
HCT VFR BLD CALC: 40.3 % (ref 34–48)
HEMOGLOBIN: 13.1 G/DL (ref 11.5–15.5)
IMMATURE GRANULOCYTES #: 0.04 E9/L
IMMATURE GRANULOCYTES %: 0.3 % (ref 0–5)
LYMPHOCYTES ABSOLUTE: 2.45 E9/L (ref 1.5–4)
LYMPHOCYTES RELATIVE PERCENT: 20.2 % (ref 20–42)
MCH RBC QN AUTO: 29.8 PG (ref 26–35)
MCHC RBC AUTO-ENTMCNC: 32.5 % (ref 32–34.5)
MCV RBC AUTO: 91.6 FL (ref 80–99.9)
METER GLUCOSE: 170 MG/DL (ref 74–99)
METER GLUCOSE: 57 MG/DL (ref 74–99)
MONOCYTES ABSOLUTE: 0.76 E9/L (ref 0.1–0.95)
MONOCYTES RELATIVE PERCENT: 6.3 % (ref 2–12)
NEUTROPHILS ABSOLUTE: 8.65 E9/L (ref 1.8–7.3)
NEUTROPHILS RELATIVE PERCENT: 71.5 % (ref 43–80)
PDW BLD-RTO: 14.4 FL (ref 11.5–15)
PLATELET # BLD: 302 E9/L (ref 130–450)
PMV BLD AUTO: 11.1 FL (ref 7–12)
POTASSIUM SERPL-SCNC: 4.1 MMOL/L (ref 3.5–5)
RBC # BLD: 4.4 E12/L (ref 3.5–5.5)
SODIUM BLD-SCNC: 140 MMOL/L (ref 132–146)
WBC # BLD: 12.1 E9/L (ref 4.5–11.5)

## 2019-01-28 PROCEDURE — 2580000003 HC RX 258: Performed by: PHYSICIAN ASSISTANT

## 2019-01-28 PROCEDURE — 80048 BASIC METABOLIC PNL TOTAL CA: CPT

## 2019-01-28 PROCEDURE — 99284 EMERGENCY DEPT VISIT MOD MDM: CPT

## 2019-01-28 PROCEDURE — 85025 COMPLETE CBC W/AUTO DIFF WBC: CPT

## 2019-01-28 PROCEDURE — 2580000003 HC RX 258: Performed by: NURSE PRACTITIONER

## 2019-01-28 PROCEDURE — 36415 COLL VENOUS BLD VENIPUNCTURE: CPT

## 2019-01-28 PROCEDURE — 82962 GLUCOSE BLOOD TEST: CPT

## 2019-01-28 PROCEDURE — 96374 THER/PROPH/DIAG INJ IV PUSH: CPT

## 2019-01-28 RX ORDER — DEXTROSE MONOHYDRATE 25 G/50ML
25 INJECTION, SOLUTION INTRAVENOUS ONCE
Status: COMPLETED | OUTPATIENT
Start: 2019-01-28 | End: 2019-01-28

## 2019-01-28 RX ORDER — 0.9 % SODIUM CHLORIDE 0.9 %
500 INTRAVENOUS SOLUTION INTRAVENOUS ONCE
Status: COMPLETED | OUTPATIENT
Start: 2019-01-28 | End: 2019-01-28

## 2019-01-28 RX ADMIN — SODIUM CHLORIDE 500 ML: 9 INJECTION, SOLUTION INTRAVENOUS at 20:59

## 2019-01-28 RX ADMIN — DEXTROSE MONOHYDRATE 25 G: 25 INJECTION, SOLUTION INTRAVENOUS at 19:41

## 2019-03-18 LAB
ALBUMIN SERPL-MCNC: NORMAL G/DL
ALP BLD-CCNC: NORMAL U/L
ALT SERPL-CCNC: NORMAL U/L
ANION GAP SERPL CALCULATED.3IONS-SCNC: NORMAL MMOL/L
AST SERPL-CCNC: NORMAL U/L
AVERAGE GLUCOSE: NORMAL
BILIRUB SERPL-MCNC: NORMAL MG/DL (ref 0.1–1.4)
BUN BLDV-MCNC: NORMAL MG/DL
CALCIUM SERPL-MCNC: NORMAL MG/DL
CHLORIDE BLD-SCNC: NORMAL MMOL/L
CHOLESTEROL, TOTAL: NORMAL MG/DL
CHOLESTEROL/HDL RATIO: NORMAL
CO2: NORMAL MMOL/L
CREAT SERPL-MCNC: NORMAL MG/DL
GFR CALCULATED: NORMAL
GLUCOSE BLD-MCNC: NORMAL MG/DL
HBA1C MFR BLD: 6.6 %
HDLC SERPL-MCNC: NORMAL MG/DL (ref 35–70)
LDL CHOLESTEROL CALCULATED: NORMAL MG/DL (ref 0–160)
POTASSIUM SERPL-SCNC: NORMAL MMOL/L
SODIUM BLD-SCNC: NORMAL MMOL/L
TOTAL PROTEIN: NORMAL
TRIGL SERPL-MCNC: NORMAL MG/DL
VLDLC SERPL CALC-MCNC: NORMAL MG/DL

## 2019-03-21 LAB
CREATININE URINE: 129 MG/DL
MICROALBUMIN/CREAT 24H UR: 4.5 MG/G{CREAT}

## 2019-04-03 NOTE — ED NOTES
Assisted pt to bathroom and back to bed with a walker, pt had a BM, no urine at this time. Pt back in bed and on the cardiac monitor.       Jie Worley RN  04/03/19 0145

## 2019-04-03 NOTE — ED PROVIDER NOTES
Department of Emergency Medicine   ED  Provider Note  Admit Date/RoomTime: 4/3/2019  9:36 AM  ED Room: 21/21          History of Present Illness:  4/3/19, Time: 9:57 AM         Carmen Davis is a 75 y.o. female presenting to the ED for fall/syncope, beginning this morning. The complaint has been persistent, moderate in severity, and worsened by nothing.  states he found the patient face down, laying on her right shoulder. He states that she was having low BGLs yesterday as well which resolved with oranges and frosting. Review of Systems:   Pertinent positives and negatives are stated within HPI, all other systems reviewed and are negative.        --------------------------------------------- PAST HISTORY ---------------------------------------------  Past Medical History:  has a past medical history of Arthritis, Diabetes mellitus ([facility] Fracture, femur ([facility] Hyperlipidemia, Hypertension, Left lower quadrant pain, Memory changes, and Uses crutches. Past Surgical History:  has a past surgical history that includes Wrist surgery (Left); Femur Surgery (06/02/2017); fracture surgery; Colonoscopy (08/03/2017); Cataract removal with implant (Bilateral); Appendectomy (08/29/2017); and pr open rx femur fx+plate/screw (Left, 17/3/5061). Social History:  reports that she has never smoked. She has never used smokeless tobacco. She reports that she does not drink alcohol or use drugs. Family History: family history includes Heart Disease in her mother. The patients home medications have been reviewed.     Allergies: Sulfa antibiotics        ---------------------------------------------------PHYSICAL EXAM--------------------------------------    Constitutional/General: Alert and oriented x3  Head: Normocephalic and atraumatic  Eyes: PERRL, EOMI, conjunctiva normal, sclera non icteric  Mouth: Oropharynx clear, handling secretions, no trismus, no asymmetry of the posterior oropharynx or uvular edema  Neck: Supple, full ROM, no stridor, no meningeal signs  Respiratory: Lungs clear to auscultation bilaterally, no wheezes, rales, or rhonchi. Not in respiratory distress  Cardiovascular:  Regular rate. Regular rhythm. No murmurs, no aortic murmurs, no gallops, or rubs. 2+ distal pulses. Equal extremity pulses. Chest: No chest wall tenderness  GI:  Abdomen Soft, Non tender, Non distended. +BS. No rebound, guarding, or rigidity. No pulsatile masses. Musculoskeletal: Holds right shoulder in internal rotation. Palpable clicks upon PROM. Unable to passively rotate past neutral position secondary to patient discomfort  Integument: skin warm and dry. No rashes. Neurologic: GCS 15, no focal deficits, symmetric strength 5/5 in the upper and lower extremities bilaterally  Psychiatric: Normal Affect          -------------------------------------------------- RESULTS -------------------------------------------------  I have personally reviewed all laboratory and imaging results for this patient. Results are listed below.      LABS:  Results for orders placed or performed during the hospital encounter of 04/03/19   CBC Auto Differential   Result Value Ref Range    WBC 9.2 4.5 - 11.5 E9/L    RBC 4.06 3.50 - 5.50 E12/L    Hemoglobin 12.0 11.5 - 15.5 g/dL    Hematocrit 37.6 34.0 - 48.0 %    MCV 92.6 80.0 - 99.9 fL    MCH 29.6 26.0 - 35.0 pg    MCHC 31.9 (L) 32.0 - 34.5 %    RDW 13.8 11.5 - 15.0 fL    Platelets 615 345 - 772 E9/L    MPV 10.7 7.0 - 12.0 fL    Neutrophils % 69.1 43.0 - 80.0 %    Immature Granulocytes % 0.2 0.0 - 5.0 %    Lymphocytes % 22.1 20.0 - 42.0 %    Monocytes % 6.6 2.0 - 12.0 %    Eosinophils % 1.5 0.0 - 6.0 %    Basophils % 0.5 0.0 - 2.0 %    Neutrophils # 6.35 1.80 - 7.30 E9/L    Immature Granulocytes # 0.02 E9/L    Lymphocytes # 2.04 1.50 - 4.00 E9/L    Monocytes # 0.61 0.10 - 0.95 E9/L    Eosinophils # 0.14 0.05 - 0.50 E9/L    Basophils # 0.05 0.00 - 0.20 H6/S   Basic Metabolic Panel w/ Reflex to MG   Result Value Ref Range    Sodium 139 132 - 146 mmol/L    Potassium reflex Magnesium 4.3 3.5 - 5.0 mmol/L    Chloride 103 98 - 107 mmol/L    CO2 26 22 - 29 mmol/L    Anion Gap 10 7 - 16 mmol/L    Glucose 132 (H) 74 - 99 mg/dL    BUN 22 8 - 23 mg/dL    CREATININE 0.9 0.5 - 1.0 mg/dL    GFR Non-African American >60 >=60 mL/min/1.73    GFR African American >60     Calcium 9.9 8.6 - 10.2 mg/dL   Troponin   Result Value Ref Range    Troponin <0.01 0.00 - 0.03 ng/mL   Brain Natriuretic Peptide   Result Value Ref Range    Pro-BNP 72 0 - 450 pg/mL   Urinalysis with Microscopic   Result Value Ref Range    Color, UA Yellow Straw/Yellow    Clarity, UA Clear Clear    Glucose, Ur Negative Negative mg/dL    Bilirubin Urine Negative Negative    Ketones, Urine Negative Negative mg/dL    Specific Gravity, UA 1.010 1.005 - 1.030    Blood, Urine Negative Negative    pH, UA 6.0 5.0 - 9.0    Protein, UA Negative Negative mg/dL    Urobilinogen, Urine 0.2 <2.0 E.U./dL    Nitrite, Urine Negative Negative    Leukocyte Esterase, Urine Negative Negative   POCT Glucose   Result Value Ref Range    Glucose 125 mg/dL    QC OK? yes    POCT Glucose   Result Value Ref Range    Meter Glucose 125 (H) 74 - 99 mg/dL       RADIOLOGY:  Interpreted by Radiologist unless otherwise specified  CT Head WO Contrast   Final Result   1. No CT evidence of acute intracranial abnormality, as questioned. If   there is clinical concern for potential underlying acute   cerebrovascular infarction/accident or other potential abnormalities   of underlying brain parenchyma, MRI of the brain would be recommended   for more detailed evaluation. .   2. Mild to moderate cerebral volume loss/atrophy. White matter changes   suggestive of sequelae small vessel ischemic disease. XR CHEST STANDARD (2 VW)   Final Result      No evidence of airspace consolidation or pulmonary venous congestion   and unchanged from prior radiograph.           XR SHOULDER RIGHT (MIN 2 VIEWS) Final Result      NO SIGNIFICANT BONY ABNORMALITY IS SEEN IN THE RIGHT SHOULDER. EKG Interpretation    Interpreted by emergency department physician    Rhythm: normal sinus   Rate: normal  Axis: normal  Ectopy: none  Conduction: normal  ST Segments: no acute change  T Waves: no acute change  Q Waves: none    Clinical Impression: no acute changes    Renea Metcalf    ------------------------- NURSING NOTES AND VITALS REVIEWED ---------------------------   The nursing notes within the ED encounter and vital signs as below have been reviewed by myself. BP (!) 129/56   Pulse 90   Temp 97.9 °F (36.6 °C) (Oral)   Resp 16   Ht 5' 5\" (1.651 m)   Wt 142 lb (64.4 kg)   SpO2 94%   BMI 23.63 kg/m²   Oxygen Saturation Interpretation: Normal    The patients available past medical records and past encounters were reviewed. ------------------------------ ED COURSE/MEDICAL DECISION MAKING----------------------  Medications   sodium chloride flush 0.9 % injection 10 mL (10 mLs Intravenous Given 4/3/19 1226)   fentaNYL (SUBLIMAZE) injection 75 mcg (75 mcg Intravenous Not Given 4/3/19 1131)   ketorolac (TORADOL) injection 15 mg (15 mg Intravenous Given 4/3/19 1225)              Medical Decision Making:    Patient presents to the ED after a fall with right shoulder pain. Pain and ROM improved with 25 mcg of fentanyl. Patient ambulated with walker. Labwork unremarkable. Will discharge home. Re-Evaluations:             ED Course as of Apr 03 1227   Wed Apr 03, 2019   1024 EKG Interpretation    Interpreted by emergency department physician    Rhythm: normal sinus   Rate: normal  Axis: normal  Ectopy: none  Conduction: normal  ST Segments: no acute change  T Waves: no acute change  Q Waves: none    Clinical Impression: no acute changes    Renea Metcalf      [AD]      ED Course User Index  [AD] Renea Metcalf DO       Counseling:    The emergency provider has spoken with the patient and family and discussed todays results, in addition to providing specific details for the plan of care and counseling regarding the diagnosis and prognosis. Questions are answered at this time and they are agreeable with the plan.       --------------------------------- IMPRESSION AND DISPOSITION ---------------------------------    IMPRESSION  1. Fall, initial encounter    2. Acute pain of right shoulder        DISPOSITION  Disposition: Discharge to home  Patient condition is fair        NOTE: This report was transcribed using voice recognition software.  Every effort was made to ensure accuracy; however, inadvertent computerized transcription errors may be present       Yogi Guzman DO  Resident  04/03/19 7112

## 2019-04-30 NOTE — ED NOTES
Ambulated patient for 30 feet with walker. Assistance needed to guide and turn.  Pulse Ox 93% while ambulating     [facility] [place], [place]  85/49/48 [id number]

## 2019-04-30 NOTE — ED PROVIDER NOTES
3.50 - 5.50 E12/L    Hemoglobin 12.2 11.5 - 15.5 g/dL    Hematocrit 37.5 34.0 - 48.0 %    MCV 92.8 80.0 - 99.9 fL    MCH 30.2 26.0 - 35.0 pg    MCHC 32.5 32.0 - 34.5 %    RDW 14.2 11.5 - 15.0 fL    Platelets 087 324 - 645 E9/L    MPV 11.1 7.0 - 12.0 fL    Neutrophils % 58.5 43.0 - 80.0 %    Immature Granulocytes % 0.3 0.0 - 5.0 %    Lymphocytes % 29.9 20.0 - 42.0 %    Monocytes % 7.1 2.0 - 12.0 %    Eosinophils % 3.6 0.0 - 6.0 %    Basophils % 0.6 0.0 - 2.0 %    Neutrophils # 5.97 1.80 - 7.30 E9/L    Immature Granulocytes # 0.03 E9/L    Lymphocytes # 3.05 1.50 - 4.00 E9/L    Monocytes # 0.73 0.10 - 0.95 E9/L    Eosinophils # 0.37 0.05 - 0.50 E9/L    Basophils # 0.06 0.00 - 0.20 E9/L   Troponin   Result Value Ref Range    Troponin <0.01 0.00 - 0.03 ng/mL   Brain Natriuretic Peptide   Result Value Ref Range    Pro-BNP 88 0 - 450 pg/mL   Basic Metabolic Panel   Result Value Ref Range    Sodium 141 132 - 146 mmol/L    Potassium 4.3 3.5 - 5.0 mmol/L    Chloride 103 98 - 107 mmol/L    CO2 25 22 - 29 mmol/L    Anion Gap 13 7 - 16 mmol/L    Glucose 41 (L) 74 - 99 mg/dL    BUN 20 8 - 23 mg/dL    CREATININE 0.8 0.5 - 1.0 mg/dL    GFR Non-African American >60 >=60 mL/min/1.73    GFR African American >60     Calcium 9.9 8.6 - 10.2 mg/dL   D-Dimer, Quantitative   Result Value Ref Range    D-Dimer, Quant 435 ng/mL DDU   EKG 12 Lead   Result Value Ref Range    Ventricular Rate 85 BPM    Atrial Rate 85 BPM    P-R Interval 150 ms    QRS Duration 80 ms    Q-T Interval 390 ms    QTc Calculation (Bazett) 464 ms    P Axis 57 degrees    R Axis 31 degrees    T Axis 54 degrees       RADIOLOGY:  Interpreted by Radiologist.  XR CHEST PORTABLE   Final Result   No acute cardiopulmonary process. ------------------------- NURSING NOTES AND VITALS REVIEWED ---------------------------   The nursing notes within the ED encounter and vital signs as below have been reviewed.    BP (!) 111/48   Pulse 85   Temp 97.7 °F (36.5 °C) (Oral) 0986

## 2019-06-28 NOTE — PROGRESS NOTES
6/28/19    CC:   Chief Complaint   Patient presents with    Follow-Up from Hospital        S: patient here for transition into care. Was seen by other physicians. Patient wonders if she should have carotid us due to her s/s. Dr. Twin Maldonado feels she has dementia and parkinsons. BSS better. Past Medical History:   Diagnosis Date    Arthritis     Diabetes mellitus ([address]     Fracture, femur ([address] 06/2017    right    Hyperlipidemia     Hypertension     Left lower quadrant pain 08/2017    Memory changes     mild    Uses crutches 08/2017    related to recent left femur fracture       Family History   Problem Relation Age of Onset    Heart Disease Mother     Diabetes Mother     Hypertension Mother     Coronary Art Dis Father     Diabetes Father     Hypertension Father     No Known Problems Brother     No Known Problems Brother        Past Surgical History:   Procedure Laterality Date    APPENDECTOMY  08/29/2017    laparoscopic    CATARACT REMOVAL WITH IMPLANT Bilateral     COLONOSCOPY  08/03/2017   [facility] FEMUR SURGERY  06/02/2017    [place]      FRACTURE SURGERY      fractured tibia right 6/2017    [place] OPEN RX FEMUR FX+PLATE/SCREW Left 04/5/9109    LEFT FEMUR OPEN REDUCTION INTERNAL FIXATION performed by Trinity Eisenberg MD at [address] [place] [address]        Social History     Tobacco Use    Smoking status: Never Smoker    Smokeless tobacco: Never Used   Substance Use Topics    Alcohol use: No    Drug use: No       ROS:  No CP, No palpitations,   No sob, No cough,   No abd pain, No heartburn,   No headaches,   No tingling, No numbness, No weakness,   No bowel changes, No hematochezia, No melena,  No bladder changes, No hematuria  No skin rashes, No skin lesions. No vision changes, No hearing changes,   No polyuria, polydipsia, polyphagia. Stable mood. ROS otherwise negative unless as listed in HPI. Chart reviewed and updated where appropriate for PMH, Fam, and Soc Hx.     Physical Exam   /76   Pulse 74   Temp 97.7 °F (36.5 °C)   Wt 136 lb (61.7 kg)   SpO2 97%   BMI 22.63 kg/m²   Wt Readings from Last 3 Encounters:   06/28/19 136 lb (61.7 kg)   05/26/19 142 lb (64.4 kg)   04/30/19 142 lb (64.4 kg)       Constitutional:    She is alert. She appears chronically ill. HENT:    Right Ear: Tympanic membrane, external ear and ear canal normal.    Left Ear: Tympanic membrane, external ear and ear canal normal.    Nose: Nose normal.    Mouth/Throat: Oropharynx is clear and moist.   Eyes:    Conjunctivae are normal.    Pupils are equal, round, and reactive to light. EOMI. Neck:    Normal range of motion. No thyromegaly or nodules noted. No bruit. Cardiovascular:    Normal rate, regular rhythm and normal heart sounds. No murmur. No gallop and no friction rub. Pulmonary/Chest:    Effort normal and breath sounds normal.    No wheezes. No rales or rhonchi. Abdominal:    Soft. Bowel sounds are normal.    No distension. No tenderness. Musculoskeletal:    Normal range of motion. No joint swelling noted. No peripheral edema. Neurological:    She is alert and oriented to person, place, and time. Motor and sensation grossly intact. Manual wheelchair. Skin:    Skin is warm and dry. No rashes, lesions. Psychiatric:    She has a normal mood and affect. Normal groom and dress.     Current Outpatient Medications on File Prior to Visit   Medication Sig Dispense Refill    donepezil (ARICEPT ODT) 5 MG disintegrating tablet Take 10 mg by mouth every morning       aspirin 81 MG tablet Take 81 mg by mouth every morning      vitamin B-12 (CYANOCOBALAMIN) 1000 MCG tablet Take 1,000 mcg by mouth every morning      Multiple Vitamins-Minerals (CENTRUM SILVER) TABS Take 1 tablet by mouth daily (with breakfast)       cephALEXin (KEFLEX) 500 MG capsule Take 500 mg by mouth 3 times daily      fluticasone (FLONASE) 50 MCG/ACT nasal spray 1 spray by Nasal route daily (Patient taking differently: 2 sprays by Nasal route nightly ) 1 Bottle 0     No current facility-administered medications on file prior to visit. Patient Active Problem List   Diagnosis Code    Closed left hip fracture, initial encounter ([facility] S72.002A    Cl[facility] subcapital [facility] S72.012A    Essential hypertension I10    Mixed hyperlipidemia E78.2    Leukocytosis D72.829    Controlled type 2 diabetes mellitus without complication ([facility] S36.2    Hyperlipidemia E78.5    Acute blood loss anemia D62        Assessment / Plan  Mitra Alvares was seen today for follow-up from hospital.    Diagnoses and all orders for this visit:    Dementia without behavioral disturbance, unspecified dementia type  -     US CAROTID ARTERY BILATERAL; Future    Essential hypertension    Type 2 diabetes mellitus without complication, without long-term current use of insulin ([facility]    Other orders  -     atorvastatin (LIPITOR) 20 MG tablet; Take 1 tablet by mouth Daily with supper  -     glipiZIDE (GLUCOTROL XL) 10 MG extended release tablet; Take 1 tablet by mouth 2 times daily (with meals) Indications: Lunch and Dinner  -     lisinopril-hydrochlorothiazide (PRINZIDE;ZESTORETIC) 10-12.5 MG per tablet; Take 1 tablet by mouth daily (with breakfast)  -     metFORMIN (GLUCOPHAGE-XR) 500 MG extended release tablet; Take 2 tablets by mouth 2 times daily    Reviewed Pmhx, meds, diet, BSS, records. US carotid done. Recheck post results. No follow-ups on file. Patient counseled to follow up sooner or seek more acute care if symptoms worsening. Electronically signed by Anjali Bowman DO on 6/28/2019    This note may have been created using dictation software.  Efforts were made to reduce grammatical or syntax errors, but some may persist.

## 2019-08-16 NOTE — TELEPHONE ENCOUNTER
Gloria Tang called in and said Connor has a UTI. Urine is cloudy/strong odor and blood sugar is elevated. He asked if you will call in an antibiotic?    Please advise

## 2019-08-26 PROBLEM — E16.2 HYPOGLYCEMIA: Status: ACTIVE | Noted: 2019-01-01

## 2019-08-30 NOTE — PROGRESS NOTES
Skin: denied any rash, ulcer, Hirsute, or hyperpigmentation. MSK: denied any joint deformity, joint pain/swelling, muscle pain, or back pain. Neuro: no numbness, no tingling, no weakness, _    OBJECTIVE    /62   Pulse 84   Resp 16   Ht 5' 5\" (1.651 m)   Wt 126 lb 3.2 oz (57.2 kg)   BMI 21.00 kg/m²   BP Readings from Last 4 Encounters:   08/30/19 120/62   08/27/19 114/61   06/28/19 126/76   05/26/19 132/65     Wt Readings from Last 6 Encounters:   08/30/19 126 lb 3.2 oz (57.2 kg)   08/27/19 133 lb 8 oz (60.6 kg)   06/28/19 136 lb (61.7 kg)   05/26/19 142 lb (64.4 kg)   04/30/19 142 lb (64.4 kg)   04/03/19 142 lb (64.4 kg)       Physical examination:  General: awake alert, oriented x3, no abnormal position or movements. HEENT: normocephalic non-traumatic, no exophthalmos   Neck: supple, no LN enlargement, no thyromegaly, no thyroid tenderness, no JVD. Pulm: Clear equal air entry no added sounds, no wheezing or rhonchi    CVS: S1 + S2, no murmur, no heave. Dorsalis pedis pulse palpable   Abd: soft lax, no tenderness, no organomegaly, audible bowel sounds.    Skin: warm, no lesions, no rash. + callus, no Ulcers, No acanthosis nigricans  Musculoskeletal: No back tenderness, no kyphosis/scoliosis    Neuro: CN intact, Monofilament sensation decreased bilateral , muscle power normal  Psych: normal mood, and affect      Review of Laboratory Data:  I personally reviewed the following lab:  Lab Results   Component Value Date/Time    WBC 5.7 08/27/2019 08:40 AM    RBC 3.98 08/27/2019 08:40 AM    HGB 12.2 08/27/2019 08:40 AM    HCT 36.8 08/27/2019 08:40 AM    MCV 92.5 08/27/2019 08:40 AM    MCH 30.7 08/27/2019 08:40 AM    MCHC 33.2 08/27/2019 08:40 AM    RDW 13.4 08/27/2019 08:40 AM     08/27/2019 08:40 AM    MPV 11.4 08/27/2019 08:40 AM      Lab Results   Component Value Date/Time     08/27/2019 08:40 AM    K 3.9 08/27/2019 08:40 AM    CO2 26 08/27/2019 08:40 AM    BUN 16 08/27/2019 08:40 AM CREATININE 0.9 08/27/2019 08:40 AM    CALCIUM 9.5 08/27/2019 08:40 AM    LABGLOM >60 08/27/2019 08:40 AM    GFRAA >60 08/27/2019 08:40 AM      Lab Results   Component Value Date/Time    TSH 6.380 (H) 11/11/2018 05:00 AM    T4FREE 1.12 11/11/2018 05:00 AM    [id number] 6.4 11/11/2018 05:00 AM     Lab Results   Component Value Date    LABA1C 6.1 08/30/2019    GLUCOSE 105 08/27/2019     Lab Results   Component Value Date    LABA1C 6.1 08/30/2019     No results found for: CHOL, TRIG, HDL  No results found for: [address] [address] Records/Labs/Images review:   I personally reviewed and summarized previous records   All labs and imaging studies were independently reviewed     ASSESSMENT & RECOMMENDATIONS   Belkys Russo, a 75 y.o.-old female seen in for the following issues     Diabetes Mellitus Type 2     · A1c 6.1%   · Continue Metformin 1000 mg BID   · Due to risk of hypoglycemia, will avoid sulfonylurea in this pt   · If BS started to increase will add DPP-4 inhibitors such as Januvia or tradjenta   · The patient was advised to check blood sugars 1-2 times a day before meals and send BS readings to our office in few weeks  · Discussed with patient A1c and blood sugar goals   · Optimal blood sugars: 100-140 pre-prandial, < 180 peak post-prandial  · Patient up to date with the routine diabetes maintenance and prevention  · Diabetes labs before next visit     Hypoglycemia  · Improved after stopped glipizide      Subclinical Hypothyroidism   · Labs 11/2018 showed TSH 6.38, Free T4 1.12   · Currently not on thyroid hormones replacement  · Will repeat TFT before next visit     Return in about 4 months (around 12/30/2019) for DM type 2 . The above issues were reviewed with the patient who understood and agreed with the plan. Thank you for allowing us to participate in the care of this patient. Please do not hesitate to contact us with any additional questions. Diagnosis Orders   1.  Hypoglycemia  POCT glycosylated

## 2019-08-30 NOTE — LETTER
 donepezil (ARICEPT ODT) 5 MG disintegrating tablet Take 10 mg by mouth every morning ON HOLD PER NEUROLOGY DR. VITAL FOR 2 WEEKS STARTING 8/19/24. PATIENT WAS SLEEPING A LOT AND FAMILY WANTED TO SEE IF THIS WAS THE CAUSE.  aspirin 81 MG tablet Take 81 mg by mouth every morning      vitamin B-12 (CYANOCOBALAMIN) 1000 MCG tablet Take 1,000 mcg by mouth every morning      Multiple Vitamins-Minerals (CENTRUM SILVER) TABS Take 1 tablet by mouth daily (with breakfast)        No current facility-administered medications for this visit. Review of Systems  Constitutional: No fever, no chills, no diaphoresis, no generalized weakness. HEENT: No blurred vision, No sore throat, no ear pain, no hair loss  Neck: denied any neck swelling, difficulty swallowing,   Cardio-pulmonary: No CP, SOB or palpitation, No orthopnea or PND. No cough or wheezing. GI: No N/V/D, no constipation, No abdominal pain, no melena or hematochezia   : Denied any dysuria, hematuria, flank pain, discharge, or incontinence. Skin: denied any rash, ulcer, Hirsute, or hyperpigmentation. MSK: denied any joint deformity, joint pain/swelling, muscle pain, or back pain. Neuro: no numbness, no tingling, no weakness, _    OBJECTIVE    /62   Pulse 84   Resp 16   Ht 5' 5\" (1.651 m)   Wt 126 lb 3.2 oz (57.2 kg)   BMI 21.00 kg/m²    BP Readings from Last 4 Encounters:   08/30/19 120/62   08/27/19 114/61   06/28/19 126/76   05/26/19 132/65     Wt Readings from Last 6 Encounters:   08/30/19 126 lb 3.2 oz (57.2 kg)   08/27/19 133 lb 8 oz (60.6 kg)   06/28/19 136 lb (61.7 kg)   05/26/19 142 lb (64.4 kg)   04/30/19 142 lb (64.4 kg)   04/03/19 142 lb (64.4 kg)       Physical examination:  General: awake alert, oriented x3, no abnormal position or movements. HEENT: normocephalic non-traumatic, no exophthalmos   Neck: supple, no LN enlargement, no thyromegaly, no thyroid tenderness, no JVD.

## 2019-09-03 NOTE — PROGRESS NOTES
needs: Medical: Not on file     Non-medical: Not on file   Tobacco Use    Smoking status: Never Smoker    Smokeless tobacco: Never Used   Substance and Sexual Activity    Alcohol use: No    Drug use: Never    Sexual activity: Never   Lifestyle    Physical activity:     Days per week: Not on file     Minutes per session: Not on file    Stress: Not on file   Relationships    Social connections:     Talks on phone: Not on file     Gets together: Not on file     Attends Restoration service: Not on file     Active member of club or organization: Not on file     Attends meetings of clubs or organizations: Not on file     Relationship status: Not on file    Intimate partner violence:     Fear of current or ex partner: Not on file     Emotionally abused: Not on file     Physically abused: Not on file     Forced sexual activity: Not on file   Other Topics Concern    Not on file   Social History Narrative    Not on file       Vitals:    09/03/19 1533   BP: 114/74   Pulse: 84   Temp: 97.5 °F (36.4 °C)   SpO2: 98%   Weight: 135 lb (61.2 kg)       Physical Exam   Constitutional: She is oriented to person, place, and time. Appears chronically ill, but comfortable. HENT:   Head: Normocephalic. Eyes: Pupils are equal, round, and reactive to light. EOM are normal.   Neck: Neck supple. Cardiovascular: Regular rhythm and normal heart sounds. No murmur heard. Pulmonary/Chest: Effort normal and breath sounds normal.   Abdominal: Soft. Bowel sounds are normal.   Musculoskeletal: She exhibits no tenderness. In manual wheelchair. Neurological: She is alert and oriented to person, place, and time. Skin: Skin is warm. Psychiatric: She has a normal mood and affect. POCT Orders   No Active POCT       Assessment and Plan:  Aris Robles was seen today for urinary tract infection.     Diagnoses and all orders for this visit:    Dysuria  -     POCT Urinalysis no Micro  -     Urine Culture    Type 2 diabetes mellitus

## 2019-09-23 NOTE — ED PROVIDER NOTES
Sulfasalazine        ---------------------------------------------------PHYSICAL EXAM--------------------------------------    Constitutional/General: Alert and oriented x3, well appearing, non toxic in NAD  Head: Normocephalic and atraumatic  Eyes: PERRL, EOMI, conjunctive normal, sclera non icteric  Mouth: Oropharynx clear, handling secretions, no trismus, no asymmetry of the posterior oropharynx or uvular edema, dry mucous membrane  Neck: Supple, full ROM, non tender to palpation in the midline, no stridor, no crepitus, no meningeal signs  Respiratory: Lungs clear to auscultation bilaterally, no wheezes, rales, or rhonchi. Not in respiratory distress  Cardiovascular:  Regular rate. Regular rhythm. No murmurs, gallops, or rubs. 2+ distal pulses  Chest: No chest wall tenderness  GI:  Abdomen Soft, Non tender, Non distended. +BS. No organomegaly, no palpable masses,  No rebound, guarding, or rigidity. Musculoskeletal: Moves all extremities x 4. Warm and well perfused, no clubbing, cyanosis, or edema. Capillary refill <3 seconds  Integument: skin warm and dry. No rashes. Lymphatic: no lymphadenopathy noted  Neurologic: GCS 15, no focal deficits,  Psychiatric: Normal Affect    -------------------------------------------------- RESULTS -------------------------------------------------  I have personally reviewed all laboratory and imaging results for this patient. Results are listed below.      LABS:  Results for orders placed or performed during the hospital encounter of 09/22/19   CBC Auto Differential   Result Value Ref Range    WBC 7.6 4.5 - 11.5 E9/L    RBC 4.09 3.50 - 5.50 E12/L    Hemoglobin 12.4 11.5 - 15.5 g/dL    Hematocrit 37.7 34.0 - 48.0 %    MCV 92.2 80.0 - 99.9 fL    MCH 30.3 26.0 - 35.0 pg    MCHC 32.9 32.0 - 34.5 %    RDW 13.3 11.5 - 15.0 fL    Platelets 046 157 - 730 E9/L    MPV 11.4 7.0 - 12.0 fL    Neutrophils % 65.9 43.0 - 80.0 %    Immature Granulocytes % 0.4 0.0 - 5.0 %    Lymphocytes % 25.0 20.0 - 42.0 %    Monocytes % 7.7 2.0 - 12.0 %    Eosinophils % 0.7 0.0 - 6.0 %    Basophils % 0.3 0.0 - 2.0 %    Neutrophils Absolute 4.99 1.80 - 7.30 E9/L    Immature Granulocytes # 0.03 E9/L    Lymphocytes Absolute 1.89 1.50 - 4.00 E9/L    Monocytes Absolute 0.58 0.10 - 0.95 E9/L    Eosinophils Absolute 0.05 0.05 - 0.50 E9/L    Basophils Absolute 0.02 0.00 - 0.20 E9/L   Comprehensive Metabolic Panel   Result Value Ref Range    Sodium 136 132 - 146 mmol/L    Potassium 4.0 3.5 - 5.0 mmol/L    Chloride 100 98 - 107 mmol/L    CO2 25 22 - 29 mmol/L    Anion Gap 11 7 - 16 mmol/L    Glucose 336 (H) 74 - 99 mg/dL    BUN 20 8 - 23 mg/dL    CREATININE 1.0 0.5 - 1.0 mg/dL    GFR Non-African American 54 >=60 mL/min/1.73    GFR African American >60     Calcium 9.7 8.6 - 10.2 mg/dL    Total Protein 7.4 6.4 - 8.3 g/dL    Alb 4.4 3.5 - 5.2 g/dL    Total Bilirubin 0.3 0.0 - 1.2 mg/dL    Alkaline Phosphatase 61 35 - 104 U/L    ALT 20 0 - 32 U/L    AST 22 0 - 31 U/L   Lipase   Result Value Ref Range    Lipase 52 13 - 60 U/L   Lactic Acid, Plasma   Result Value Ref Range    Lactic Acid 1.9 0.5 - 2.2 mmol/L   Beta-Hydroxybutyrate   Result Value Ref Range    Beta-Hydroxybutyrate 0.12 0.02 - 0.27 mmol/L   pH, venous   Result Value Ref Range    pH, Rosalio 7.39 7.35 - 7.45   Magnesium   Result Value Ref Range    Magnesium 1.8 1.6 - 2.6 mg/dL   Urinalysis, reflex to microscopic   Result Value Ref Range    Color, UA Yellow Straw/Yellow    Clarity, UA CLOUDY (A) Clear    Glucose, Ur 500 (A) Negative mg/dL    Bilirubin Urine Negative Negative    Ketones, Urine Negative Negative mg/dL    Specific Gravity, UA 1.025 1.005 - 1.030    Blood, Urine Negative Negative    pH, UA 5.5 5.0 - 9.0    Protein, UA Negative Negative mg/dL    Urobilinogen, Urine 0.2 <2.0 E.U./dL    Nitrite, Urine Negative Negative    Leukocyte Esterase, Urine SMALL (A) Negative   Troponin   Result Value Ref Range    Troponin <0.01 0.00 - 0.03 ng/mL   Microscopic Urinalysis

## 2019-11-12 PROBLEM — R29.6 MULTIPLE FALLS: Status: ACTIVE | Noted: 2019-01-01

## 2019-11-12 PROBLEM — S72.002A CLOSED LEFT HIP FRACTURE, INITIAL ENCOUNTER (HCC): Status: RESOLVED | Noted: 2018-10-04 | Resolved: 2019-01-01

## 2019-11-12 PROBLEM — E16.2 HYPOGLYCEMIA: Status: RESOLVED | Noted: 2019-01-01 | Resolved: 2019-01-01

## 2019-12-18 PROBLEM — N39.0 UTI (URINARY TRACT INFECTION): Status: ACTIVE | Noted: 2019-01-01

## 2019-12-18 PROBLEM — G93.41 METABOLIC ENCEPHALOPATHY: Status: ACTIVE | Noted: 2019-01-01

## 2019-12-19 PROBLEM — R13.10 DYSPHAGIA: Status: ACTIVE | Noted: 2019-01-01

## 2020-01-01 ENCOUNTER — APPOINTMENT (OUTPATIENT)
Dept: GENERAL RADIOLOGY | Age: 75
End: 2020-01-01
Payer: MEDICARE

## 2020-01-01 ENCOUNTER — TELEPHONE (OUTPATIENT)
Dept: FAMILY MEDICINE CLINIC | Age: 75
End: 2020-01-01

## 2020-01-01 ENCOUNTER — HOSPITAL ENCOUNTER (EMERGENCY)
Age: 75
Discharge: HOME | End: 2020-02-08
Attending: EMERGENCY MEDICINE
Payer: MEDICARE

## 2020-01-01 ENCOUNTER — APPOINTMENT (OUTPATIENT)
Dept: CT IMAGING | Age: 75
End: 2020-01-01
Payer: MEDICARE

## 2020-01-01 ENCOUNTER — HOSPITAL ENCOUNTER (EMERGENCY)
Age: 75
Discharge: HOME | End: 2020-02-29
Attending: EMERGENCY MEDICINE
Payer: MEDICARE

## 2020-01-01 ENCOUNTER — OFFICE VISIT (OUTPATIENT)
Dept: ENDOCRINOLOGY | Age: 75
End: 2020-01-01
Payer: MEDICARE

## 2020-01-01 VITALS
OXYGEN SATURATION: 99 % | BODY MASS INDEX: 19.99 KG/M2 | HEART RATE: 93 BPM | SYSTOLIC BLOOD PRESSURE: 151 MMHG | TEMPERATURE: 98.3 F | DIASTOLIC BLOOD PRESSURE: 71 MMHG | HEIGHT: 65 IN | RESPIRATION RATE: 16 BRPM | WEIGHT: 120 LBS

## 2020-01-01 VITALS
SYSTOLIC BLOOD PRESSURE: 130 MMHG | RESPIRATION RATE: 16 BRPM | HEART RATE: 113 BPM | DIASTOLIC BLOOD PRESSURE: 72 MMHG | OXYGEN SATURATION: 99 %

## 2020-01-01 VITALS
SYSTOLIC BLOOD PRESSURE: 109 MMHG | WEIGHT: 126 LBS | TEMPERATURE: 97.3 F | OXYGEN SATURATION: 96 % | RESPIRATION RATE: 16 BRPM | HEIGHT: 65 IN | BODY MASS INDEX: 20.99 KG/M2 | DIASTOLIC BLOOD PRESSURE: 61 MMHG | HEART RATE: 88 BPM

## 2020-01-01 PROCEDURE — 3017F COLORECTAL CA SCREEN DOC REV: CPT | Performed by: INTERNAL MEDICINE

## 2020-01-01 PROCEDURE — 72170 X-RAY EXAM OF PELVIS: CPT

## 2020-01-01 PROCEDURE — 71045 X-RAY EXAM CHEST 1 VIEW: CPT

## 2020-01-01 PROCEDURE — 2500000003 HC RX 250 WO HCPCS: Performed by: EMERGENCY MEDICINE

## 2020-01-01 PROCEDURE — 1123F ACP DISCUSS/DSCN MKR DOCD: CPT | Performed by: INTERNAL MEDICINE

## 2020-01-01 PROCEDURE — G8400 PT W/DXA NO RESULTS DOC: HCPCS | Performed by: INTERNAL MEDICINE

## 2020-01-01 PROCEDURE — G8420 CALC BMI NORM PARAMETERS: HCPCS | Performed by: INTERNAL MEDICINE

## 2020-01-01 PROCEDURE — 70450 CT HEAD/BRAIN W/O DYE: CPT

## 2020-01-01 PROCEDURE — G8482 FLU IMMUNIZE ORDER/ADMIN: HCPCS | Performed by: INTERNAL MEDICINE

## 2020-01-01 PROCEDURE — 4040F PNEUMOC VAC/ADMIN/RCVD: CPT | Performed by: INTERNAL MEDICINE

## 2020-01-01 PROCEDURE — 12001 RPR S/N/AX/GEN/TRNK 2.5CM/<: CPT

## 2020-01-01 PROCEDURE — 1090F PRES/ABSN URINE INCON ASSESS: CPT | Performed by: INTERNAL MEDICINE

## 2020-01-01 PROCEDURE — 3046F HEMOGLOBIN A1C LEVEL >9.0%: CPT | Performed by: INTERNAL MEDICINE

## 2020-01-01 PROCEDURE — 6360000002 HC RX W HCPCS: Performed by: EMERGENCY MEDICINE

## 2020-01-01 PROCEDURE — 90471 IMMUNIZATION ADMIN: CPT | Performed by: EMERGENCY MEDICINE

## 2020-01-01 PROCEDURE — 72125 CT NECK SPINE W/O DYE: CPT

## 2020-01-01 PROCEDURE — 99284 EMERGENCY DEPT VISIT MOD MDM: CPT

## 2020-01-01 PROCEDURE — 90715 TDAP VACCINE 7 YRS/> IM: CPT | Performed by: EMERGENCY MEDICINE

## 2020-01-01 PROCEDURE — 6370000000 HC RX 637 (ALT 250 FOR IP): Performed by: EMERGENCY MEDICINE

## 2020-01-01 PROCEDURE — 2022F DILAT RTA XM EVC RTNOPTHY: CPT | Performed by: INTERNAL MEDICINE

## 2020-01-01 PROCEDURE — 99214 OFFICE O/P EST MOD 30 MIN: CPT | Performed by: INTERNAL MEDICINE

## 2020-01-01 PROCEDURE — 1036F TOBACCO NON-USER: CPT | Performed by: INTERNAL MEDICINE

## 2020-01-01 PROCEDURE — G8427 DOCREV CUR MEDS BY ELIG CLIN: HCPCS | Performed by: INTERNAL MEDICINE

## 2020-01-01 RX ORDER — ACETAMINOPHEN 650 MG
TABLET, EXTENDED RELEASE ORAL
Status: DISCONTINUED
Start: 2020-01-01 | End: 2020-01-01 | Stop reason: HOSPADM

## 2020-01-01 RX ORDER — GINSENG 100 MG
CAPSULE ORAL ONCE
Status: COMPLETED | OUTPATIENT
Start: 2020-01-01 | End: 2020-01-01

## 2020-01-01 RX ORDER — LIDOCAINE HYDROCHLORIDE AND EPINEPHRINE 10; 10 MG/ML; UG/ML
20 INJECTION, SOLUTION INFILTRATION; PERINEURAL ONCE
Status: COMPLETED | OUTPATIENT
Start: 2020-01-01 | End: 2020-01-01

## 2020-01-01 RX ORDER — DIAPER,BRIEF,INFANT-TODD,DISP
EACH MISCELLANEOUS ONCE
Status: COMPLETED | OUTPATIENT
Start: 2020-01-01 | End: 2020-01-01

## 2020-01-01 RX ADMIN — TETANUS TOXOID, REDUCED DIPHTHERIA TOXOID AND ACELLULAR PERTUSSIS VACCINE, ADSORBED 0.5 ML: 5; 2.5; 8; 8; 2.5 SUSPENSION INTRAMUSCULAR at 13:04

## 2020-01-01 RX ADMIN — LIDOCAINE HYDROCHLORIDE,EPINEPHRINE BITARTRATE 20 ML: 10; .01 INJECTION, SOLUTION INFILTRATION; PERINEURAL at 10:58

## 2020-01-01 RX ADMIN — BACITRACIN: 500 OINTMENT TOPICAL at 13:26

## 2020-01-01 RX ADMIN — LIDOCAINE HYDROCHLORIDE,EPINEPHRINE BITARTRATE 20 ML: 10; .01 INJECTION, SOLUTION INFILTRATION; PERINEURAL at 13:03

## 2020-01-01 RX ADMIN — BACITRACIN: 500 OINTMENT TOPICAL at 13:03

## 2020-01-01 ASSESSMENT — PAIN SCALES - GENERAL
PAINLEVEL_OUTOF10: 2

## 2020-01-01 ASSESSMENT — PAIN DESCRIPTION - LOCATION: LOCATION: HEAD

## 2020-01-01 ASSESSMENT — PAIN DESCRIPTION - PAIN TYPE: TYPE: ACUTE PAIN

## 2020-01-16 NOTE — TELEPHONE ENCOUNTER
Liane Hatch from [facility] calling in. Patient is a previous patient of theirs. However they received notice from patient's insurance that they are rejecting the initial order for home health care from the hospital as it was written by a nurse practitioner. To have the home health care approved under patient's insurance they would need the order co-signed by her doctor. They are wondering if you would be willing to sign this order? They will fax order over for you to sign if you are willing.    Please advise, thank you

## 2020-01-17 PROBLEM — N39.0 UTI (URINARY TRACT INFECTION): Status: RESOLVED | Noted: 2019-01-01 | Resolved: 2020-01-01

## 2020-01-27 NOTE — PROGRESS NOTES
08/2017    related to recent left femur fracture     PAST SURGICAL HISTORY   Past Surgical History:   Procedure Laterality Date    APPENDECTOMY  08/29/2017    laparoscopic    CATARACT REMOVAL WITH IMPLANT Bilateral     COLONOSCOPY  08/03/2017   Sharanthony Sheffield FEMUR SURGERY  06/02/2017    [place], [place]      FRACTURE SURGERY      fractured tibia right 6/2017    IA OPEN RX FEMUR FX+PLATE/SCREW Left 94/5/7600    LEFT FEMUR OPEN REDUCTION INTERNAL FIXATION performed by Roberta Pennington MD at [place] [address] WRIST SURGERY Left      SOCIAL HISTORY   Social History     Socioeconomic History    Marital status:      Spouse name: Not on file    Number of children: Not on file    Years of education: Not on file    Highest education level: Not on file   Occupational History    Not on file   Social Needs    Financial resource strain: Not on file    Food insecurity:     Worry: Not on file     Inability: Not on file    Transportation needs:     Medical: Not on file     Non-medical: Not on file   Tobacco Use    Smoking status: Never Smoker    Smokeless tobacco: Never Used   Substance and Sexual Activity    Alcohol use: No    Drug use: Never    Sexual activity: Never   Lifestyle    Physical activity:     Days per week: Not on file     Minutes per session: Not on file    Stress: Not on file   Relationships    Social connections:     Talks on phone: Not on file     Gets together: Not on file     Attends Zoroastrian service: Not on file     Active member of club or organization: Not on file     Attends meetings of clubs or organizations: Not on file     Relationship status: Not on file    Intimate partner violence:     Fear of current or ex partner: Not on file     Emotionally abused: Not on file     Physically abused: Not on file     Forced sexual activity: Not on file   Other Topics Concern    Not on file   Social History Narrative    Not on file     FAMILY HISTORY   Family History   Problem Relation the patient who understood and agreed with the plan. Thank you for allowing us to participate in the care of this patient. Please do not hesitate to contact us with any additional questions. Diagnosis Orders   1. Type 2 diabetes mellitus without complication, with long-term current use of insulin (HCC)  Basic Metabolic Panel    Hemoglobin A1C   2. Subclinical hypothyroidism  TSH without Reflex    T4, Free   3.  Vitamin D deficiency  Vitamin D 25 Hydroxy     Mickie Mallory MD  Endocrinologist, [facility]   [address] [place] [address]   Phone: [phone]  Fax: [phone]  --------------------------------------------  Electronically signed by Flower Mccain MD

## 2020-01-27 NOTE — PATIENT INSTRUCTIONS
Recommendations for today's visit  · Continue current dose of Metformin and Januvia   · Check blood sugar 2 times a day   · Follow up in 4 months   · Blood work before next visit     Email:  [email or web address]     These are your blood sugar, blood pressure, cholesterol and A1c goals:  · Blood sugar fastin mg/dl to 130 mg/dl  · Blood sugar before meals: <150 mg/dl  · Peak blood sugar lower than 180 mg/dl  · Bad cholesterol (LDL cholesterol): less than 100 mg/dl  · Blood pressure: less than 140/80 mmHg\  · A1c: between 6.5 - 7.5%    I you have any questions please call Dr. Tra Martinez office       Tomer Morley MD  Endocrinologist, [facility])   [address] [place] [address], [address] [place] [address],[facility] [id number]   Phone: [phone]  Fax: [phone]  Email: [email or web address]

## 2020-02-08 NOTE — ED PROVIDER NOTES
supple. No neck rigidity. Cardiovascular:      Rate and Rhythm: Normal rate and regular rhythm. Pulses: Normal pulses. Heart sounds: Murmur present. Pulmonary:      Effort: No respiratory distress. Breath sounds: Normal breath sounds. No wheezing, rhonchi or rales. Abdominal:      General: Abdomen is flat. Bowel sounds are normal.      Palpations: Abdomen is soft. Musculoskeletal:         General: No swelling, tenderness or deformity. Right lower leg: No edema. Left lower leg: No edema. Skin:     General: Skin is warm and dry. Capillary Refill: Capillary refill takes less than 2 seconds. Neurological:      Mental Status: She is alert. She is disoriented. Procedures   Laceration Repair Procedure Note    Indication: Laceration    Procedure: The patient was placed in the appropriate position and anesthesia around the laceration was obtained by infiltration using 3.0 cc of 1% Lidocaine with epinephrine. The area was then cleansed with Shur-Clens and draped in a sterile fashion and debrided. The laceration was closed with 5-0 Ethilon using semi-horizontal mattress and running sutures. There were no additional lacerations requiring repair. The wound area was then dressed with bacitracin and a sterile dressing. Minimal debridement was preformed, flaps were aligned. No foreign body was identified. Total repaired wound length: 5 cm. Other Items: Suture count: 2    The patient tolerated the procedure well. Complications: None      MDM     ED Course as of Feb 08 1802   Sat Feb 08, 2020   1258 No evidence of intracranial bleeding or pathology. No signs of foreign or retained bodies within the laceration. No signs of cervical fractures or dislocations. Patient's son is at bedside and reports that she is at her mental baseline. No other injuries are able to be identified on questioning patient and on physical exam.  Laceration cleaned and sutured.   She is up-to-date on tetanus. Will discharge back to nursing facility. [JL]      ED Course User Index  [JL] Shruti Oliva DO        --------------------------------------------- PAST HISTORY ---------------------------------------------  Past Medical History:  has a past medical history of Arthritis, Diabetes mellitus ([facility] [address] Fracture, femur ([facility] [address] Hyperlipidemia, Hypertension, Left lower quadrant pain, Memory changes, Obesity, Overactive bladder, and Uses crutches. Past Surgical History:  has a past surgical history that includes Wrist surgery (Left); Femur Surgery (06/02/2017); fracture surgery; Colonoscopy (08/03/2017); Cataract removal with implant (Bilateral); Appendectomy (08/29/2017); pr open rx femur fx+plate/screw (Left, 73/8/5987); and Tonsillectomy. Social History:  reports that she has never smoked. She has never used smokeless tobacco. She reports that she does not drink alcohol or use drugs. Family History: family history includes Coronary Art Dis in her father; Diabetes in her father and mother; Heart Disease in her mother; Hypertension in her father and mother; No Known Problems in her brother and brother. The patients home medications have been reviewed. Allergies: Sulfa antibiotics and Sulfasalazine    -------------------------------------------------- RESULTS -------------------------------------------------  Labs:  No results found for this visit on 02/08/20. Radiology:  CT Head WO Contrast   Final Result   There is a degree of encephalomalacia greater than expected for age,   with prominence of peripheral sulci overlying the convexities. Specifically, there is no evidence of intracranial hemorrhage or mass   effect, and no calvarial traumatic findings are noted. There is a frontal laceration in the external soft tissues. No   retained foreign body or soft tissue gas is noted.       CT Cervical Spine WO Contrast   Final Result   Advanced degenerative changes of the cervical spine with preservation   of alignment documented on November 12, 2019. No acute skeletal   traumatic findings or adjacent paraspinous deep soft tissue traumatic   findings are documented. ------------------------- NURSING NOTES AND VITALS REVIEWED ---------------------------  Date / Time Roomed:  2/8/2020 10:28 AM  ED Bed Assignment:  RENAE/RENAE    The nursing notes within the ED encounter and vital signs as below have been reviewed. /61   Pulse 88   Temp 97.3 °F (36.3 °C) (Oral)   Resp 16   Ht 5' 5\" (1.651 m)   Wt 126 lb (57.2 kg)   SpO2 96%   BMI 20.97 kg/m²   Oxygen Saturation Interpretation: Normal      ------------------------------------------ PROGRESS NOTES ------------------------------------------  1:04 PM  I have spoken with the patient and Son and discussed todays results, in addition to providing specific details for the plan of care and counseling regarding the diagnosis and prognosis. Their questions are answered at this time and they are agreeable with the plan. I discussed at length with them reasons for immediate return here for re evaluation. They will followup with their primary care physician by calling their office Next week.      --------------------------------- ADDITIONAL PROVIDER NOTES ---------------------------------  At this time the patient is without objective evidence of an acute process requiring hospitalization or inpatient management. They have remained hemodynamically stable throughout their entire ED visit and are stable for discharge with outpatient follow-up. The plan has been discussed in detail and they are aware of the specific conditions for emergent return, as well as the importance of follow-up. Discharge Medication List as of 2/8/2020 12:57 PM          Diagnosis:  1. Fall, initial encounter    2.  Laceration of scalp, initial encounter        Disposition:  Patient's disposition: Discharge to nursing home  Patient's condition is stable.             Rita Elaine DO  Resident  02/08/20 8974

## 2020-02-29 NOTE — ED PROVIDER NOTES
HPI:  2/29/20, Time: 12:12 PM         Gia Hinson is a 75 y.o. female presenting to the ED for a head injury. Patient is primarily wheelchair bound. Per EMS report, patient fell out of her wheelchair striking her head prior to arrival.  Unknown what the patient hit her head on. There was no loss of consciousness. No reported anticoagulation. Patient has no complaints at this time. Patient is in hospice, DNR CC. Review of Systems:   Limited secondary to patient condition.    --------------------------------------------- PAST HISTORY ---------------------------------------------  Past Medical History:  has a past medical history of Arthritis, Diabetes mellitus ([facility] [address] Fracture, femur ([facility] [address] Hyperlipidemia, Hypertension, Left lower quadrant pain, Memory changes, Obesity, Overactive bladder, and Uses crutches. Past Surgical History:  has a past surgical history that includes Wrist surgery (Left); Femur Surgery (06/02/2017); fracture surgery; Colonoscopy (08/03/2017); Cataract removal with implant (Bilateral); Appendectomy (08/29/2017); pr open rx femur fx+plate/screw (Left, 73/5/1613); and Tonsillectomy. Social History:  reports that she has never smoked. She has never used smokeless tobacco. She reports that she does not drink alcohol or use drugs. Family History: family history includes Coronary Art Dis in her father; Diabetes in her father and mother; Heart Disease in her mother; Hypertension in her father and mother; No Known Problems in her brother and brother. The patients home medications have been reviewed. Allergies: Sulfa antibiotics and Sulfasalazine    -------------------------------------------------- RESULTS -------------------------------------------------  All laboratory and radiology results have been personally reviewed by myself   LABS:  No results found for this visit on 02/29/20.     RADIOLOGY:  Interpreted by Radiologist.  XR CHEST PORTABLE   Final Result   No acute cardiopulmonary disease process is identified. XR PELVIS (1-2 VIEWS)   Final Result      Rotated pelvis on this single AP projection. No obvious fracture. If   there is persistent clinical concern, a CT can be obtained. CT Head WO Contrast   Final Result      Right frontal scalp laceration with small foci of soft tissue gas   underlying contusion. No underlying calvarial fracture. No intracranial hemorrhage or other acute intracranial process. Senescent changes with moderate generalized parenchymal volume loss   more than expected for age. CT Cervical Spine WO Contrast   Final Result      No CT evidence of an acute cervical spine fracture or traumatic   subluxation. Multilevel degenerative changes of the spine, asymmetrically involving   the right normal foramina, as described above.                   ------------------------- NURSING NOTES AND VITALS REVIEWED ---------------------------   The nursing notes within the ED encounter and vital signs as below have been reviewed. BP (!) 151/71   Pulse 93   Temp 98.3 °F (36.8 °C)   Resp 16   Ht 5' 5\" (1.651 m)   Wt 120 lb (54.4 kg)   SpO2 99%   BMI 19.97 kg/m²   Oxygen Saturation Interpretation: Normal      ---------------------------------------------------PHYSICAL EXAM--------------------------------------      Constitutional/General: Alert and oriented x2 (person, place), non toxic in NAD  Head: Normocephalic. Patient has a stellate shaped laceration over the right upper frontal forehead. It measures 5 cm x 3 cm x 2 cm. Bleeding controlled. Eyes: PERRL, EOMI; no periorbital ecchymosis. Ears: No hemotympanum; No Sheffield's sign  Mouth: Oropharynx clear, handling secretions, no trismus  Neck: Supple, full ROM, No midline TTP  Pulmonary: Lungs clear to auscultation bilaterally, no wheezes, rales, or rhonchi. Not in respiratory distress  Cardiovascular:  Regular rate and rhythm, no murmurs, gallops, or rubs.  2+ distal pulses  Abdomen: Soft, non tender, non distended,   Extremities: Moves all extremities x 4. Warm and well perfused  Skin: warm and dry without rash  Neurologic: GCS 14, No focal neurological deficits  Psych: Normal Affect      ------------------------------ ED COURSE/MEDICAL DECISION MAKING----------------------  Medications   povidone-iodine (BETADINE) 10 % external solution (has no administration in time range)   Tetanus-Diphth-Acell Pertussis (BOOSTRIX) injection 0.5 mL (0.5 mLs Intramuscular Given 2/29/20 1304)   lidocaine-EPINEPHrine 1 percent-1:[id number] injection 20 mL (20 mLs Intradermal Given 2/29/20 1303)   bacitracin zinc ointment ( Topical Given 2/29/20 1303)       Medical Decision Making: Brittany Nunez presents after a fall and a head injury. Patient has a forehead laceration. Tetanus was updated. CT the head shows no acute intracranial process. ED cervical spine unremarkable as well. Chest x-ray and pelvis x-ray showed no acute injuries. Laceration was repaired by myself. Please see below procedural note. Patient tolerated this well without complication. Bacitracin and a gauze dressing was placed over the wound and wound care instructions relayed to the patient's family, her brothers. She will be discharged back to her facility and sutures to be removed in 7 to 10 days. All questions answered. Return indications and follow up discussed. Patient agreeable with the plan and discharge. PROCEDURE NOTE  2/29/20       Time: 200    LACERATION REPAIR  Risks, benefits and alternatives (for applicable procedures below) described. Performed By: Irma Crane MD.    Laceration #: 1. Location: forehead  Length: 5 x 3 x 2 cm. The wound area was cleansed with povidone iodine and draped in a sterile fashion. Local Anesthesia:  Lidocaine 1% with epinephrine. The wound was explored with the following results:  no foreign body or tendon injury seen.   Debridement: full

## (undated) DEVICE — TOWEL,OR,DSP,ST,BLUE,STD,6/PK,12PK/CS: Brand: MEDLINE

## (undated) DEVICE — TUBING, SUCTION, 9/32" X 10', STRAIGHT: Brand: MEDLINE

## (undated) DEVICE — STRIP,CLOSURE,WOUND,MEDI-STRIP,1/2X4: Brand: MEDLINE

## (undated) DEVICE — STERILE HOOK LOCK LATEX FREE ELASTIC BANDAGE 4INX5YD: Brand: HOOK LOCK™

## (undated) DEVICE — COVER DSG W7 7 8XL11IN WHT POR CLTH PRECUT WTRPRF MEDIPORE

## (undated) DEVICE — GUIDEWIRE ORTH L300MM DIA2.8MM S STL THRD SHRP TRCR TIP FOR

## (undated) DEVICE — APPLICATOR PREP 26ML 0.7% IOD POVACRYLEX 74% ISO ALC ST

## (undated) DEVICE — 4-PORT MANIFOLD: Brand: NEPTUNE 2

## (undated) DEVICE — CHLORAPREP 26ML ORANGE

## (undated) DEVICE — Z DISCONTINUED PER MEDLINE USE 2741942 DRESSING AQUACEL 6 IN ALG W9XL15CM SIL CVR WTRPRF VIR BACT BARR ANTIMIC

## (undated) DEVICE — DRAPE PATIENT ISOL IRRIG POUCH

## (undated) DEVICE — PATIENT RETURN ELECTRODE, SINGLE-USE, CONTACT QUALITY MONITORING, ADULT, WITH 9FT CORD, FOR PATIENTS WEIGING OVER 33LBS. (15KG): Brand: MEGADYNE

## (undated) DEVICE — SUTURE VCRL SZ 2-0 L27IN ABSRB UD L26MM SH 1/2 CIR J417H

## (undated) DEVICE — DRAPE C ARM W41XL74IN UNIV MOB W RUBBERBAND CLP

## (undated) DEVICE — PACK PROCEDURE SURG GEN CUST

## (undated) DEVICE — PAD PERINL POST FOAM DISPOSABLE FOR AMSCO SURG TBL

## (undated) DEVICE — SOLUTION IV IRRIG POUR BRL 0.9% SODIUM CHL 2F7124

## (undated) DEVICE — DOUBLE BASIN SET: Brand: MEDLINE INDUSTRIES, INC.